# Patient Record
Sex: MALE | Race: WHITE | Employment: FULL TIME | URBAN - METROPOLITAN AREA
[De-identification: names, ages, dates, MRNs, and addresses within clinical notes are randomized per-mention and may not be internally consistent; named-entity substitution may affect disease eponyms.]

---

## 2017-04-25 ENCOUNTER — OFFICE VISIT (OUTPATIENT)
Dept: INTERNAL MEDICINE CLINIC | Age: 32
End: 2017-04-25

## 2017-04-25 VITALS
SYSTOLIC BLOOD PRESSURE: 110 MMHG | HEIGHT: 70 IN | BODY MASS INDEX: 23.77 KG/M2 | WEIGHT: 166 LBS | DIASTOLIC BLOOD PRESSURE: 72 MMHG

## 2017-04-25 DIAGNOSIS — M25.562 LATERAL KNEE PAIN, LEFT: Primary | ICD-10-CM

## 2017-04-25 PROCEDURE — 99213 OFFICE O/P EST LOW 20 MIN: CPT | Performed by: INTERNAL MEDICINE

## 2017-04-25 RX ORDER — MELOXICAM 15 MG/1
15 TABLET ORAL DAILY
Qty: 15 TABLET | Refills: 0 | Status: SHIPPED | OUTPATIENT
Start: 2017-04-25 | End: 2017-08-17 | Stop reason: ALTCHOICE

## 2017-08-17 ENCOUNTER — OFFICE VISIT (OUTPATIENT)
Dept: INTERNAL MEDICINE CLINIC | Age: 32
End: 2017-08-17

## 2017-08-17 VITALS
OXYGEN SATURATION: 98 % | HEART RATE: 75 BPM | BODY MASS INDEX: 22.81 KG/M2 | WEIGHT: 159 LBS | DIASTOLIC BLOOD PRESSURE: 64 MMHG | SYSTOLIC BLOOD PRESSURE: 100 MMHG

## 2017-08-17 DIAGNOSIS — Z00.00 ANNUAL PHYSICAL EXAM: Primary | ICD-10-CM

## 2017-08-17 DIAGNOSIS — C81.70 OTHER CLASSICAL HODGKIN LYMPHOMA, UNSPECIFIED BODY REGION (HCC): ICD-10-CM

## 2017-08-17 DIAGNOSIS — F51.01 PRIMARY INSOMNIA: ICD-10-CM

## 2017-08-17 PROCEDURE — 99395 PREV VISIT EST AGE 18-39: CPT | Performed by: INTERNAL MEDICINE

## 2017-08-17 RX ORDER — PAROXETINE HYDROCHLORIDE 25 MG/1
TABLET, FILM COATED, EXTENDED RELEASE ORAL
Qty: 90 TABLET | Refills: 3 | Status: SHIPPED | OUTPATIENT
Start: 2017-08-17

## 2017-08-17 RX ORDER — TRAZODONE HYDROCHLORIDE 100 MG/1
100 TABLET ORAL NIGHTLY
Qty: 90 TABLET | Refills: 3 | Status: SHIPPED | OUTPATIENT
Start: 2017-08-17 | End: 2021-11-15 | Stop reason: ALTCHOICE

## 2017-09-08 ENCOUNTER — TELEPHONE (OUTPATIENT)
Dept: ENT CLINIC | Age: 32
End: 2017-09-08

## 2018-04-25 ENCOUNTER — OFFICE VISIT (OUTPATIENT)
Dept: INTERNAL MEDICINE CLINIC | Age: 33
End: 2018-04-25

## 2018-04-25 VITALS
DIASTOLIC BLOOD PRESSURE: 62 MMHG | HEIGHT: 70 IN | SYSTOLIC BLOOD PRESSURE: 108 MMHG | HEART RATE: 68 BPM | BODY MASS INDEX: 23.77 KG/M2 | WEIGHT: 166 LBS

## 2018-04-25 DIAGNOSIS — B36.9 FUNGAL DERMATITIS: Primary | ICD-10-CM

## 2018-04-25 PROCEDURE — 99213 OFFICE O/P EST LOW 20 MIN: CPT | Performed by: INTERNAL MEDICINE

## 2018-04-25 ASSESSMENT — PATIENT HEALTH QUESTIONNAIRE - PHQ9
1. LITTLE INTEREST OR PLEASURE IN DOING THINGS: 0
2. FEELING DOWN, DEPRESSED OR HOPELESS: 0
SUM OF ALL RESPONSES TO PHQ QUESTIONS 1-9: 0
SUM OF ALL RESPONSES TO PHQ9 QUESTIONS 1 & 2: 0

## 2018-07-18 ENCOUNTER — OFFICE VISIT (OUTPATIENT)
Dept: INTERNAL MEDICINE CLINIC | Age: 33
End: 2018-07-18

## 2018-07-18 ENCOUNTER — HOSPITAL ENCOUNTER (OUTPATIENT)
Dept: OTHER | Age: 33
Discharge: HOME | End: 2018-07-18
Attending: INTERNAL MEDICINE | Admitting: INTERNAL MEDICINE

## 2018-07-18 VITALS
DIASTOLIC BLOOD PRESSURE: 68 MMHG | WEIGHT: 161 LBS | BODY MASS INDEX: 23.05 KG/M2 | HEIGHT: 70 IN | SYSTOLIC BLOOD PRESSURE: 112 MMHG

## 2018-07-18 DIAGNOSIS — T14.8XXA SPRAIN: ICD-10-CM

## 2018-07-18 DIAGNOSIS — T14.8XXA SPRAIN: Primary | ICD-10-CM

## 2018-07-18 PROCEDURE — 99213 OFFICE O/P EST LOW 20 MIN: CPT | Performed by: INTERNAL MEDICINE

## 2018-07-18 RX ORDER — NAPROXEN 500 MG/1
500 TABLET ORAL 2 TIMES DAILY WITH MEALS
Qty: 30 TABLET | Refills: 0 | Status: SHIPPED | OUTPATIENT
Start: 2018-07-18 | End: 2018-08-10

## 2018-07-19 ENCOUNTER — TELEPHONE (OUTPATIENT)
Dept: INTERNAL MEDICINE CLINIC | Age: 33
End: 2018-07-19

## 2018-07-19 NOTE — TELEPHONE ENCOUNTER
----- Message from Elida Bingham MD sent at 7/18/2018  2:21 PM EDT -----  No evidence of fracture was seen on R hand x-ray    F/u if symptoms persist after naprosyn trial    Elida Bingham

## 2018-08-10 ENCOUNTER — OFFICE VISIT (OUTPATIENT)
Dept: INTERNAL MEDICINE CLINIC | Age: 33
End: 2018-08-10

## 2018-08-10 VITALS
WEIGHT: 161 LBS | HEIGHT: 70 IN | BODY MASS INDEX: 23.05 KG/M2 | DIASTOLIC BLOOD PRESSURE: 68 MMHG | SYSTOLIC BLOOD PRESSURE: 110 MMHG

## 2018-08-10 DIAGNOSIS — S63.501D SPRAIN OF RIGHT WRIST, SUBSEQUENT ENCOUNTER: Primary | ICD-10-CM

## 2018-08-10 PROCEDURE — 99213 OFFICE O/P EST LOW 20 MIN: CPT | Performed by: INTERNAL MEDICINE

## 2018-08-10 RX ORDER — MELOXICAM 15 MG/1
15 TABLET ORAL DAILY
Qty: 30 TABLET | Refills: 0 | Status: SHIPPED | OUTPATIENT
Start: 2018-08-10 | End: 2019-08-27

## 2018-08-10 NOTE — PROGRESS NOTES
Subjective:      Patient ID: Janet Lake is a 33 y.o. male. HPI-c/o persistent R hand pain after mowing lawn, hand x-rays are normal, symptoms persist after 2 weeks Naprosyn    Review of Systems     Current Outpatient Prescriptions on File Prior to Visit   Medication Sig Dispense Refill    traZODone (DESYREL) 100 MG tablet Take 1 tablet by mouth nightly 90 tablet 3    PARoxetine (PAXIL CR) 25 MG extended release tablet TAKE 1 TABLET BY MOUTH EVERY nightly 90 tablet 3     No current facility-administered medications on file prior to visit. Objective:   Physical Exam   Constitutional: He is oriented to person, place, and time. He appears well-developed and well-nourished. /68   Ht 5' 10\" (1.778 m)   Wt 161 lb (73 kg)   BMI 23.10 kg/m²    Neck: No JVD present. Cardiovascular: Normal rate, regular rhythm and normal heart sounds. Pulmonary/Chest: Effort normal and breath sounds normal.   Abdominal: Soft. Bowel sounds are normal.   Musculoskeletal: Normal range of motion. Neurological: He is alert and oriented to person, place, and time. Psychiatric: He has a normal mood and affect. His behavior is normal. Thought content normal.   Nursing note and vitals reviewed.       Assessment:      Persistent R hand pain      Plan:      Hand Surgery referral [facility]    Try Meloxicam 15 mg QD x 1 month    Justina Mckeon MD

## 2019-08-27 ENCOUNTER — HOSPITAL ENCOUNTER (OUTPATIENT)
Age: 34
Discharge: HOME | End: 2019-08-27
Payer: COMMERCIAL

## 2019-08-27 ENCOUNTER — HOSPITAL ENCOUNTER (OUTPATIENT)
Dept: GENERAL RADIOLOGY | Age: 34
Discharge: HOME | End: 2019-08-27
Payer: COMMERCIAL

## 2019-08-27 ENCOUNTER — OFFICE VISIT (OUTPATIENT)
Dept: PRIMARY CARE CLINIC | Age: 34
End: 2019-08-27
Payer: COMMERCIAL

## 2019-08-27 VITALS
BODY MASS INDEX: 23.42 KG/M2 | SYSTOLIC BLOOD PRESSURE: 116 MMHG | WEIGHT: 163.2 LBS | OXYGEN SATURATION: 99 % | HEART RATE: 92 BPM | DIASTOLIC BLOOD PRESSURE: 80 MMHG | TEMPERATURE: 98.2 F

## 2019-08-27 DIAGNOSIS — C81.01 NODULAR LYMPHOCYTE PREDOMINANT HODGKIN LYMPHOMA OF LYMPH NODES OF NECK (HCC): ICD-10-CM

## 2019-08-27 DIAGNOSIS — R11.2 NAUSEA AND VOMITING, INTRACTABILITY OF VOMITING NOT SPECIFIED, UNSPECIFIED VOMITING TYPE: Primary | ICD-10-CM

## 2019-08-27 LAB
A/G RATIO: 1.8 (ref 1.1–2.2)
ALBUMIN SERPL-MCNC: 4.9 G/DL (ref 3.4–5)
ALP BLD-CCNC: 91 U/L (ref 40–129)
ALT SERPL-CCNC: 24 U/L (ref 10–40)
ANION GAP SERPL CALCULATED.3IONS-SCNC: 15 MMOL/L (ref 3–16)
AST SERPL-CCNC: 19 U/L (ref 15–37)
BILIRUB SERPL-MCNC: 0.6 MG/DL (ref 0–1)
BILIRUBIN, POC: ABNORMAL
BLOOD URINE, POC: ABNORMAL
BUN BLDV-MCNC: 14 MG/DL (ref 7–20)
CALCIUM SERPL-MCNC: 10 MG/DL (ref 8.3–10.6)
CHLORIDE BLD-SCNC: 97 MMOL/L (ref 99–110)
CLARITY, POC: CLEAR
CO2: 26 MMOL/L (ref 21–32)
COLOR, POC: YELLOW
CREAT SERPL-MCNC: 1.3 MG/DL (ref 0.9–1.3)
GFR AFRICAN AMERICAN: >60
GFR NON-AFRICAN AMERICAN: >60
GLOBULIN: 2.8 G/DL
GLUCOSE BLD-MCNC: 123 MG/DL (ref 70–99)
GLUCOSE URINE, POC: ABNORMAL
HCT VFR BLD CALC: 49.1 % (ref 40.5–52.5)
HEMOGLOBIN: 17.2 G/DL (ref 13.5–17.5)
KETONES, POC: ABNORMAL
LEUKOCYTE EST, POC: ABNORMAL
MCH RBC QN AUTO: 31.6 PG (ref 26–34)
MCHC RBC AUTO-ENTMCNC: 35 G/DL (ref 31–36)
MCV RBC AUTO: 90.2 FL (ref 80–100)
NITRITE, POC: ABNORMAL
PDW BLD-RTO: 12.3 % (ref 12.4–15.4)
PH, POC: 6.5
PLATELET # BLD: 285 K/UL (ref 135–450)
PMV BLD AUTO: 8.3 FL (ref 5–10.5)
POTASSIUM SERPL-SCNC: 3.8 MMOL/L (ref 3.5–5.1)
PROTEIN, POC: ABNORMAL
RBC # BLD: 5.45 M/UL (ref 4.2–5.9)
SEDIMENTATION RATE, ERYTHROCYTE: 7 MM/HR (ref 0–15)
SODIUM BLD-SCNC: 138 MMOL/L (ref 136–145)
SPECIFIC GRAVITY, POC: 1.01
TOTAL PROTEIN: 7.7 G/DL (ref 6.4–8.2)
UROBILINOGEN, POC: 0.2
WBC # BLD: 7.5 K/UL (ref 4–11)

## 2019-08-27 PROCEDURE — 80053 COMPREHEN METABOLIC PANEL: CPT

## 2019-08-27 PROCEDURE — G8427 DOCREV CUR MEDS BY ELIG CLIN: HCPCS | Performed by: INTERNAL MEDICINE

## 2019-08-27 PROCEDURE — 99213 OFFICE O/P EST LOW 20 MIN: CPT | Performed by: INTERNAL MEDICINE

## 2019-08-27 PROCEDURE — G8420 CALC BMI NORM PARAMETERS: HCPCS | Performed by: INTERNAL MEDICINE

## 2019-08-27 PROCEDURE — 1036F TOBACCO NON-USER: CPT | Performed by: INTERNAL MEDICINE

## 2019-08-27 PROCEDURE — 71046 X-RAY EXAM CHEST 2 VIEWS: CPT

## 2019-08-27 PROCEDURE — 36415 COLL VENOUS BLD VENIPUNCTURE: CPT

## 2019-08-27 PROCEDURE — 85652 RBC SED RATE AUTOMATED: CPT

## 2019-08-27 PROCEDURE — 81002 URINALYSIS NONAUTO W/O SCOPE: CPT | Performed by: INTERNAL MEDICINE

## 2019-08-27 PROCEDURE — 85027 COMPLETE CBC AUTOMATED: CPT

## 2019-08-27 RX ORDER — ONDANSETRON 4 MG/1
4 TABLET, FILM COATED ORAL DAILY PRN
Qty: 30 TABLET | Refills: 0 | Status: SHIPPED | OUTPATIENT
Start: 2019-08-27 | End: 2019-10-15

## 2019-08-27 NOTE — PROGRESS NOTES
Chief Complaint   Patient presents with    Nausea & Vomiting     X 2 weeks, vomiting and coughing, nausea, fever, no appetite, having lower back pain     Subjective:      Patient ID: Enrico Madison is a 29 y.o. male. 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 Objective:   Physical Exam   Constitutional: He is oriented to person, place, and time. He appears well-developed and well-nourished. /80 (Site: Right Upper Arm, Position: Sitting, Cuff Size: Medium Adult)   Pulse 92   Temp 98.2 °F (36.8 °C) (Oral)   Wt 163 lb 3.2 oz (74 kg)   SpO2 99%   BMI 23.42 kg/m²   No palpable lymphadenopathy on exam today   Neck: No JVD present. Cardiovascular: Normal rate, regular rhythm and normal heart sounds. Pulmonary/Chest: Effort normal and breath sounds normal.   Abdominal: Soft. Bowel sounds are normal.   Musculoskeletal: Normal range of motion. Neurological: He is alert and oriented to person, place, and time. Psychiatric: He has a normal mood and affect. His behavior is normal. Thought content normal.   Nursing note and vitals reviewed.     UA-WNL    Assessment:      Patient Active Problem List   Diagnosis    Panic attacks    Mass of right side of neck    Hodgkin's lymphoma ([facility] [address]           Plan:      Check CXR to evaluate

## 2019-10-15 ENCOUNTER — OFFICE VISIT (OUTPATIENT)
Dept: PRIMARY CARE CLINIC | Age: 34
End: 2019-10-15
Payer: COMMERCIAL

## 2019-10-15 VITALS
OXYGEN SATURATION: 99 % | WEIGHT: 169.2 LBS | DIASTOLIC BLOOD PRESSURE: 90 MMHG | TEMPERATURE: 98 F | SYSTOLIC BLOOD PRESSURE: 120 MMHG | BODY MASS INDEX: 24.28 KG/M2 | HEART RATE: 85 BPM

## 2019-10-15 DIAGNOSIS — M62.830 BACK SPASM: Primary | ICD-10-CM

## 2019-10-15 PROCEDURE — 1036F TOBACCO NON-USER: CPT | Performed by: INTERNAL MEDICINE

## 2019-10-15 PROCEDURE — G8420 CALC BMI NORM PARAMETERS: HCPCS | Performed by: INTERNAL MEDICINE

## 2019-10-15 PROCEDURE — G8427 DOCREV CUR MEDS BY ELIG CLIN: HCPCS | Performed by: INTERNAL MEDICINE

## 2019-10-15 PROCEDURE — 99213 OFFICE O/P EST LOW 20 MIN: CPT | Performed by: INTERNAL MEDICINE

## 2019-10-15 PROCEDURE — G8484 FLU IMMUNIZE NO ADMIN: HCPCS | Performed by: INTERNAL MEDICINE

## 2019-10-15 RX ORDER — TIZANIDINE 4 MG/1
4 TABLET ORAL 3 TIMES DAILY PRN
Qty: 30 TABLET | Refills: 0 | Status: SHIPPED | OUTPATIENT
Start: 2019-10-15 | End: 2021-08-10 | Stop reason: ALTCHOICE

## 2019-12-30 ENCOUNTER — PATIENT MESSAGE (OUTPATIENT)
Dept: PRIMARY CARE CLINIC | Age: 34
End: 2019-12-30
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
## 2019-12-31 DIAGNOSIS — M54.50 CHRONIC LOW BACK PAIN WITHOUT SCIATICA, UNSPECIFIED BACK PAIN LATERALITY: Primary | ICD-10-CM

## 2019-12-31 DIAGNOSIS — G89.29 CHRONIC LOW BACK PAIN WITHOUT SCIATICA, UNSPECIFIED BACK PAIN LATERALITY: Primary | ICD-10-CM

## 2021-08-10 ENCOUNTER — OFFICE VISIT (OUTPATIENT)
Dept: PRIMARY CARE CLINIC | Age: 36
End: 2021-08-10
Payer: COMMERCIAL

## 2021-08-10 VITALS
HEART RATE: 77 BPM | DIASTOLIC BLOOD PRESSURE: 86 MMHG | SYSTOLIC BLOOD PRESSURE: 124 MMHG | HEIGHT: 70 IN | BODY MASS INDEX: 25.17 KG/M2 | WEIGHT: 175.8 LBS

## 2021-08-10 DIAGNOSIS — Z11.59 SCREENING FOR VIRAL DISEASE: Primary | ICD-10-CM

## 2021-08-10 DIAGNOSIS — F41.0 PANIC ATTACKS: ICD-10-CM

## 2021-08-10 PROCEDURE — 99213 OFFICE O/P EST LOW 20 MIN: CPT | Performed by: INTERNAL MEDICINE

## 2021-08-10 ASSESSMENT — PATIENT HEALTH QUESTIONNAIRE - PHQ9
SUM OF ALL RESPONSES TO PHQ QUESTIONS 1-9: 1
1. LITTLE INTEREST OR PLEASURE IN DOING THINGS: 1
SUM OF ALL RESPONSES TO PHQ9 QUESTIONS 1 & 2: 1
SUM OF ALL RESPONSES TO PHQ QUESTIONS 1-9: 1
SUM OF ALL RESPONSES TO PHQ QUESTIONS 1-9: 1
2. FEELING DOWN, DEPRESSED OR HOPELESS: 0

## 2021-08-10 NOTE — LETTER
[address] [address]  Phone: [phone]  Fax: [phone]    Christiano House MD        August 10, 2021     Patient: Julissa Ngo   YOB: 1985   Date of Visit: 8/10/2021       To Whom It May Concern: It is my medical opinion that Lugenia Masker may return to full duty immediately with no restrictions. If you have any questions or concerns, please don't hesitate to call.     Sincerely,        Christiano House MD
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
## 2021-08-13 ENCOUNTER — PATIENT MESSAGE (OUTPATIENT)
Dept: PRIMARY CARE CLINIC | Age: 36
End: 2021-08-13

## 2021-08-18 RX ORDER — ONDANSETRON 4 MG/1
4 TABLET, FILM COATED ORAL DAILY PRN
Qty: 30 TABLET | Refills: 0 | Status: SHIPPED | OUTPATIENT
Start: 2021-08-18 | End: 2021-12-13

## 2021-11-15 ENCOUNTER — HOSPITAL ENCOUNTER (OUTPATIENT)
Dept: GENERAL RADIOLOGY | Age: 36
Discharge: HOME | End: 2021-11-15
Payer: COMMERCIAL

## 2021-11-15 ENCOUNTER — TELEPHONE (OUTPATIENT)
Dept: PRIMARY CARE CLINIC | Age: 36
End: 2021-11-15

## 2021-11-15 ENCOUNTER — OFFICE VISIT (OUTPATIENT)
Dept: PRIMARY CARE CLINIC | Age: 36
End: 2021-11-15
Payer: COMMERCIAL

## 2021-11-15 ENCOUNTER — HOSPITAL ENCOUNTER (OUTPATIENT)
Age: 36
Discharge: HOME | End: 2021-11-15
Payer: COMMERCIAL

## 2021-11-15 VITALS
SYSTOLIC BLOOD PRESSURE: 114 MMHG | HEIGHT: 70 IN | DIASTOLIC BLOOD PRESSURE: 76 MMHG | HEART RATE: 107 BPM | BODY MASS INDEX: 25.45 KG/M2 | WEIGHT: 177.8 LBS

## 2021-11-15 DIAGNOSIS — G89.29 CHRONIC LEFT-SIDED LOW BACK PAIN WITHOUT SCIATICA: ICD-10-CM

## 2021-11-15 DIAGNOSIS — M54.50 CHRONIC LEFT-SIDED LOW BACK PAIN WITHOUT SCIATICA: ICD-10-CM

## 2021-11-15 DIAGNOSIS — M54.2 CERVICALGIA OF OCCIPITO-ATLANTO-AXIAL REGION: Primary | ICD-10-CM

## 2021-11-15 PROCEDURE — 99214 OFFICE O/P EST MOD 30 MIN: CPT | Performed by: INTERNAL MEDICINE

## 2021-11-15 PROCEDURE — 72100 X-RAY EXAM L-S SPINE 2/3 VWS: CPT

## 2021-11-15 RX ORDER — TIZANIDINE 4 MG/1
4 TABLET ORAL 3 TIMES DAILY PRN
Qty: 30 TABLET | Refills: 0 | Status: SHIPPED | OUTPATIENT
Start: 2021-11-15 | End: 2021-12-13 | Stop reason: ALTCHOICE

## 2021-11-15 RX ORDER — MELOXICAM 15 MG/1
15 TABLET ORAL DAILY
Qty: 30 TABLET | Refills: 0 | Status: SHIPPED | OUTPATIENT
Start: 2021-11-15 | End: 2021-12-13 | Stop reason: ALTCHOICE
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
## 2021-11-15 NOTE — PROGRESS NOTES
Pt here with neck pain that is going to rt shoulder. Said on and off almost 2 years but more persistent last month. No issues with motion.

## 2021-11-15 NOTE — TELEPHONE ENCOUNTER
Patient stopped to check out, asked if he should get his Covid-19 booster shot? -Please advise patient.

## 2021-11-18 ENCOUNTER — HOSPITAL ENCOUNTER (OUTPATIENT)
Dept: PHYSICAL THERAPY | Age: 36
Setting detail: THERAPIES SERIES
Discharge: HOME | End: 2021-11-18
Payer: COMMERCIAL

## 2021-11-18 PROCEDURE — 97110 THERAPEUTIC EXERCISES: CPT

## 2021-11-18 PROCEDURE — 97161 PT EVAL LOW COMPLEX 20 MIN: CPT

## 2021-11-18 NOTE — FLOWSHEET NOTE
[address] [facility]  Phone: [phone]   Fax: [phone]    Physical Therapy Treatment Note/ Progress Report:     Date:  2021    Patient Name:  Jaylene Bosworth    :  1985  MRN: [id number]  Restrictions/Precautions:    Medical/Treatment Diagnosis Information:  Diagnosis: M54.50, G89.29 (ICD-10-CM) - Chronic left-sided low back pain without sciatica  Treatment Diagnosis: hypomobility of SI/lumbar spine, LLE weakness as compared to R , decreased core/hip strength, decreased functional status. Insurance/Certification information:  PT Insurance Information: [facility]  Physician Information:  Referring Practitioner: Kelly Rueda MD  Plan of care signed (Y/N): []  Yes [x]  No    Date of Patient follow up with Physician:      Progress Report: []  Yes  [x]  No     Date Range for reporting period:  Beginnin21  Ending:     Progress report due (10 Rx/or 30 days whichever is less): visit #10 or      Recertification due (POC duration/ or 90 days whichever is less): visit #12 or 22    Visit # Insurance Allowable Auth required?  Date Range    30 []  Yes  [x]  No          Latex Allergy:  [x]NO      []YES  Preferred Language for Healthcare:   [x]English       []other:    Functional Scale:        Date assessed:  MARTA: raw score = 15; dysfunction = %    21    Pain level:  5-7/10    L SI jt     SUBJECTIVE:  See eval    OBJECTIVE: See eval   Observation:    Test measurements:      RESTRICTIONS/PRECAUTIONS:   Treatment based classification - mobilization + stability     Exercises/Interventions:     Therapeutic Exercise (56870) Resistance / level Sets / Seconds Reps Notes / Cues   bike       IB       HSS       Quad rocking                                           Therapeutic Activities (85188)                                          Neuromuscular Re-ed (61271)       Pelvic tilts on SB                                   Manual Intervention (56576)       Prone sacral fixation SI mobs NPV                                                                Modalities:     Pt. Education:  -pt educated on diagnosis, prognosis and expectations for rehab  -all pt questions were answered    Home Exercise Program:  Access Code: [email or web address]  URL: [email or web address]  Date: 11/18/2021  Prepared by: Ramakrishna Cameron    Exercises  Supine Bridge - 1 x daily - 7 x weekly - 3 sets - 10 reps  Supine Lower Trunk Rotation - 1 x daily - 7 x weekly - 3 sets - 10 reps  Supine Hamstring Stretch - 1 x daily - 7 x weekly - 3 sets - 10 reps  Supine Single Knee to Chest Stretch - 1 x daily - 7 x weekly - 3 sets - 10 reps  Quadruped Rocking Backward - 1 x daily - 7 x weekly - 3 sets - 10 reps      Therapeutic Exercise and NMR EXR  [x] (18060) Provided verbal/tactile cueing for activities related to strengthening, flexibility, endurance, ROM  for improvements in proximal hip and core control with self care, mobility, lifting and ambulation.  [] (60027) Provided verbal/tactile cueing for activities related to improving balance, coordination, kinesthetic sense, posture, motor skill, proprioception  to assist with core control in self care, mobility, lifting, and ambulation.   [] (54253) Therapist is in constant attendance of 2 or more patients providing skilled therapy interventions, but not providing any significant amount of measurable one-on-one time to either patient, for improvements in LE, proximal hip, and core control in self care, mobility, lifting, ambulation and eccentric single leg control.      Therapeutic Activities:    [] (54291 or 08067) Provided verbal/tactile cueing for activities related to improving balance, coordination, kinesthetic sense, posture, motor skill, proprioception and motor activation to allow for proper function  with self care and ADLs  [] (78016) Provided training and instruction to the patient for proper core and proximal hip recruitment and positioning with ambulation re-education     Home Exercise Program:    [x] (66040) Reviewed/Progressed HEP activities related to strengthening, flexibility, endurance, ROM of core, proximal hip and LE for functional self-care, mobility, lifting and ambulation   [] (60851) Reviewed/Progressed HEP activities related to improving balance, coordination, kinesthetic sense, posture, motor skill, proprioception of core, proximal hip and LE for self care, mobility, lifting, and ambulation      Manual Treatments:  PROM / STM / Oscillations-Mobs:  G-I, II, III, IV (PA's, Inf., Post.)  [] (94868) Provided manual therapy to mobilize proximal hip and LS spine soft tissue/joints for the purpose of modulating pain, promoting relaxation,  increasing ROM, reducing/eliminating soft tissue swelling/inflammation/restriction, improving soft tissue extensibility and allowing for proper ROM for normal function with self care, mobility, lifting and ambulation. Charges:  Timed Code Treatment Minutes: 15   Total Treatment Minutes: 40       [x] EVAL - LOW (17898)   [] EVAL - MOD (18830)  [] EVAL - HIGH (25021)  [] RE-EVAL (43471)  [x] DV(45900) x 1     [] Ionto  [] NMR (00643) x      [] Vaso  [] Manual (52400) x      [] Ultrasound  [] TA x       [] Mech Traction (55976)  [] GaitTraining x     [] ES (un) (24215):   [] Home Management Training [] ES(attended) (02819)             [] Aquatics    [] Group  [] Other    GOALS:  Patient stated goal: decrease pain   []? Progressing: []? Met: []? Not Met: []? Adjusted     Therapist goals for Patient:   Short Term Goals: To be achieved in: 2 weeks  1. Independent in HEP and progression per patient tolerance, in order to prevent re-injury. []? Progressing: []? Met: []? Not Met: []? Adjusted  2. Patient will have a decrease in pain to facilitate improvement in movement, function, and ADLs as indicated by Functional Deficits. []? Progressing: []? Met: []? Not Met: []? Adjusted     Long Term Goals:  To be achieved in: 6 weeks  1. Disability index score of 10% or less for the MARTA to assist with reaching prior level of function. []? Progressing: []? Met: []? Not Met: []? Adjusted  2. Patient will demonstrate increased AROM to WNL, good LS mobility, good hip ROM to allow for proper joint functioning as indicated by patients Functional Deficits. []? Progressing: []? Met: []? Not Met: []? Adjusted  3. Patient will demonstrate an increase in Strength to 5/5  good proximal hip and core activation to allow for proper functional mobility as indicated by patients Functional Deficits. []? Progressing: []? Met: []? Not Met: []? Adjusted  4. Patient will return to functional activities including work, ADLs without increased symptoms or restriction. []? Progressing: []? Met: []? Not Met: []? Adjusted    Overall Progression Towards Functional goals/ Treatment Progress Update:  [] Patient is progressing as expected towards functional goals listed. [] Progression is slowed due to complexities/Impairments listed. [] Progression has been slowed due to co-morbidities. [x] Plan just implemented, too soon to assess goals progression <30days   [] Goals require adjustment due to lack of progress  [] Patient is not progressing as expected and requires additional follow up with physician  [] Other    Persisting Functional Limitations/Impairments:  []Sitting []Standing   []Walking []Squatting/bending    []Stairs []ADL's    []Transfers []Reaching  []Housework []Job related tasks  []Driving []Sports/Recreation   []Sleeping []Other:    ASSESSMENT:  See eval  Treatment/Activity Tolerance:  [x] Patient able to complete tx  [] Patient limited by fatique  [x] Patient limited by pain  [] Patient limited by other medical complications  [] Other:     Prognosis: [x] Good [] Fair  [] Poor    Patient Requires Follow-up: [x] Yes  [] No    PLAN: See eval. PT 1-2x / week for 6 weeks.  SI mobs then mobility - then strengthen   [] Continue per plan of care []

## 2021-11-18 NOTE — PLAN OF CARE
only on L side at PSIS and does not radiate down leg. Pt has hx of cancer and  finished CA tx 2014. 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 Going from sit to stand is painful and if he sits for too long. Xray of lumbar spine : normal     Fear avoidance: I should not do physical activities that (might) make my pain worse   [] True   [x] False     Relevant Medical History: hodgkin's lymphoma 2014, knee scope  Functional Outcome: Oswestry: raw score = 15; dysfunction = %    Pain Scale: 5-7/10  Easing factors: rest, ice or heat, medication   Provocative factors: movement, working     Type: []Constant   [x]Intermittent  []Radiating []Localized []other:     Numbness/Tingling: yes B feet due to chemo back in 2014    Occupation/School: full time [employer]  . 9-11 hours a day. Living Status/Prior Level of Function: Prior to this injury / incident, pt was independent with ADLs and IADLs, Pt enjoys golf and baseball.      OBJECTIVE:   Palpation: TTP at L PSIS    Functional Mobility/Transfers: IND - slightly painful at L SI joint    Posture: decreased lumbar lordosis when sitting    Inspection: unremarkable     Gait: (include devices/WB status) WNL, slight decreased arm swing     Bandages/Dressings/Incisions: NA    Dermatomes Normal Abnormal Comments   inguinal area (L1)       anterior mid-thigh (L2)      distal ant thigh/med knee (L3)      medial lower leg and foot (L4)  x    lateral lower leg and foot (L5)  x    posterior calf (S1) x     medial calcaneus (S2) x         Reflexes Normal Abnormal Comments   S1-2 Seated achilles x     S1-2 Prone knee bend      L3-4 Patellar tendon x  R - brisk   Clonus x     Babinski      hoffmans - normal        Bold = pain at end range  ROM  Comments   Lumbar Flex WNL    Lumbar Ext WNL Decreased L sacral sulci movement      ROM LEFT RIGHT Comments   Lumbar Side Bend WNL WNL    Lumbar Rotation WNL WNL    Hip Flexion WNL WNL    Hip Abd      Hip ER WNL WNL    Hip IR WNL WNL    Hip Extension      Knee Ext      Knee Flex      Hamstring Flex + +    Piriformis                      Joint mobility: LUMBAR / L SI   []Normal    [x]Hypo   []Hyper      Strength / Myotomes LEFT RIGHT Comments   Multifidus      Transverse Ab      Hip Flexors (L1-2) 4 5    Hip Abductors 4+ 5    Hip Extensors      Hip Internal Rotators      Hip External Rotators      Quads (L2-4) 4+ 5    Hamstrings  4+ 5    Ankle Plantarflexion (S1-2)      Ankle Dorsiflexion (L4-5) 4+ 5    Ankle Inversion      Ankle Eversion (S1-2)      Great Toe Extension (L5)            Neural dynamic tension testing Normal Abnormal Comments   Slump Test  - Degree of knee flexion:  x     SLR  x     0-30      30-70      Femoral nerve (L2-4)          Orthopedic Special Tests:    Normal Abnormal N/A Comments   Toe walk   x      Heel Walk x      Fwd Bend-aberrant or innominate mvmt) x      Trendelenburg x      Kemps/Quadrant       Stork       ZOILA/Ridge  x  Painful L SI   Hip scour       SLR x      Crossed SLR x      Supine to sit       Hip thrust  x  Pain at L SI   SI distraction/compression x      PA/Spring  x     Prone Instability test       Prone knee bend       Sacral Spring/thrust  x                [x] Patient history, allergies, meds reviewed. Medical chart reviewed. See intake form. Review Of Systems (ROS):  [x]Performed Review of systems (Integumentary, CardioPulmonary, Neurological) by intake and observation. Intake form has been scanned into medical record. Patient has been instructed to contact their primary care physician regarding ROS issues if not already being addressed at this time.       Co-morbidities/Complexities (which will affect course of rehabilitation):   []None        []Hx of COVID   Arthritic conditions   []Rheumatoid arthritis (M05.9)  []Osteoarthritis (M19.91)  []Gout   Cardiovascular conditions   []Hypertension (I10)  []Hyperlipidemia (E78.5)  []Angina pectoris (I20)  []Atherosclerosis (I70)  []Pacemaker  []Hx of CABG/stent/  cardiac surgeries   Musculoskeletal conditions   []Disc pathology   []Congenital spine pathologies   []Osteoporosis (M81.8)  []Osteopenia (M85.8)  []Scoliosis       Endocrine conditions   []Hypothyroid (E03.9)  []Hyperthyroid Gastrointestinal conditions   []Constipation (N96.94)   Metabolic conditions   []Morbid obesity (E66.01)  []Diabetes type 1(E10.65) or 2 (E11.65)   []Neuropathy (G60.9)     Cardio/Pulmonary conditions   []Asthma (J45)  []Coughing   []COPD (J44.9)  []CHF  []A-fib   Psychological Disorders  []Anxiety (F41.9)  []Depression (F32.9)   []Other:   Developmental Disorders  []Autism (F84.0)  []CP (G80)  []Down Syndrome (Q90.9)  []Developmental delay     Neurological conditions  []Prior Stroke (I69.30)  []Parkinson's (G20)  []Encephalopathy (G93.40)  []MS (G35)  []Post-polio (G14)  []SCI  []TBI  []ALS Other conditions  []Fibromyalgia (M79.7)  []Vertigo  []Syncope  []Kidney Failure  [x]Cancer      []currently undergoing                treatment  []Pregnancy  []Incontinence   Prior surgeries  []involved limb  []previous spinal surgery  [] section birth  []hysterectomy  []bowel / bladder surgery  []other relevant surgeries   []Other:              Barriers to/and or personal factors that will affect rehab potential:              []Age  []Sex    []Smoker              []Motivation/Lack of Motivation                        []Co-Morbidities              []Cognitive Function, education/learning barriers              []Environmental, home barriers              []profession/work barriers  []past PT/medical experience  [x]other: chronic issue  Justification:     Falls Risk Assessment (30 days):   [x] Falls Risk assessed and no intervention required.   [] Falls Risk assessed and Patient requires intervention due to being higher risk   TUG score (>12s at risk):     [] Falls education provided, including         ASSESSMENT:   Functional Impairments:     [x]Noted lumbar/proximal hip hypomobility   []Noted lumbosacral and/or generalized hypermobility   [x]Decreased Lumbosacral/hip/LE functional ROM   [x]Decreased core/proximal hip strength and neuromuscular control    [x]Decreased LE functional strength    []Abnormal reflexes/sensation/myotomal/dermatomal deficits  []Reduced balance/proprioceptive control    []other:      Functional Activity Limitations (from functional questionnaire and intake)   [x]Reduced ability to tolerate prolonged functional positions   [x]Reduced ability or difficulty with changes of positions or transfers between positions   [x]Reduced ability to maintain good posture and demonstrate good body mechanics with sitting, bending, and lifting   [x]Reduced ability to sleep   [x] Reduced ability or tolerance with driving and/or computer work   [x]Reduced ability to perform lifting, reaching, carrying tasks   [x]Reduced ability to squat   [x]Reduced ability to forward bend   [x]Reduced ability to ambulate prolonged functional periods/distances/surfaces   [x]Reduced ability to ascend/descend stairs   []other:       Participation Restrictions   [x]Reduced participation in self care activities   [x]Reduced participation in home management activities   [x]Reduced participation in work activities   [x]Reduced participation in social activities. [x]Reduced participation in sport/recreational activities. Classification:   [x]Signs/symptoms consistent with Lumbar instability/stabilization subgroup. [x]Signs/symptoms consistent with Lumbar mobilization/ subgroup, myotomes and dermatomes intact. Meets manipulation criteria.     []Signs/symptoms consistent with Lumbar direction specific/centralization subgroup   []Signs/symptoms consistent with Lumbar traction subgroup     []Signs/symptoms consistent with lumbar facet dysfunction   []Signs/symptoms consistent with lumbar stenosis type dysfunction   []Signs/symptoms consistent with nerve root involvement including myotome & indicated for Hip complex, LE and spine to include: Dry Needling/IASTM, STM, PROM, Gr I-IV mobilizations, manipulation. [x]  Modalities as needed that may include: thermal agents, E-stim, Biofeedback, US, iontophoresis as indicated  [x]  Patient education on joint protection, postural re-education, activity modification, progression of HEP. HEP instruction: Written HEP instructions provided and reviewed     GOALS:  Patient stated goal: decrease pain   [] Progressing: [] Met: [] Not Met: [] Adjusted    Therapist goals for Patient:   Short Term Goals: To be achieved in: 2 weeks  1. Independent in HEP and progression per patient tolerance, in order to prevent re-injury. [] Progressing: [] Met: [] Not Met: [] Adjusted  2. Patient will have a decrease in pain to facilitate improvement in movement, function, and ADLs as indicated by Functional Deficits. [] Progressing: [] Met: [] Not Met: [] Adjusted    Long Term Goals: To be achieved in: 6 weeks  1. Disability index score of 10% or less for the MARTA to assist with reaching prior level of function. [] Progressing: [] Met: [] Not Met: [] Adjusted  2. Patient will demonstrate increased AROM to WNL, good LS mobility, good hip ROM to allow for proper joint functioning as indicated by patients Functional Deficits. [] Progressing: [] Met: [] Not Met: [] Adjusted  3. Patient will demonstrate an increase in Strength to 5/5  good proximal hip and core activation to allow for proper functional mobility as indicated by patients Functional Deficits. [] Progressing: [] Met: [] Not Met: [] Adjusted  4. Patient will return to functional activities including work, ADLs without increased symptoms or restriction.    [] Progressing: [] Met: [] Not Met: [] Adjusted       Electronically signed by:  Nick Rosado PT

## 2021-12-02 ENCOUNTER — HOSPITAL ENCOUNTER (OUTPATIENT)
Dept: PHYSICAL THERAPY | Age: 36
Setting detail: THERAPIES SERIES
Discharge: HOME | End: 2021-12-02
Payer: COMMERCIAL

## 2021-12-02 PROCEDURE — 97140 MANUAL THERAPY 1/> REGIONS: CPT

## 2021-12-02 PROCEDURE — 97112 NEUROMUSCULAR REEDUCATION: CPT

## 2021-12-02 PROCEDURE — 97110 THERAPEUTIC EXERCISES: CPT

## 2021-12-02 NOTE — FLOWSHEET NOTE
[facility]  Phone: [phone]   Fax: [phone]    Physical Therapy Treatment Note/ Progress Report:     Date:  2021    Patient Name:  Conchis Oakley    :  1985  MRN: [id number]  Restrictions/Precautions:    Medical/Treatment Diagnosis Information:  Diagnosis: M54.50, G89.29 (ICD-10-CM) - Chronic left-sided low back pain without sciatica  Treatment Diagnosis: hypomobility of SI/lumbar spine, LLE weakness as compared to R , decreased core/hip strength, decreased functional status. Insurance/Certification information:  PT Insurance Information: [facility]  Physician Information:  Referring Practitioner: Yesi Fishman MD  Plan of care signed (Y/N): []  Yes [x]  No    Date of Patient follow up with Physician:      Progress Report: []  Yes  [x]  No     Date Range for reporting period:  Beginnin21  Ending:     Progress report due (10 Rx/or 30 days whichever is less): visit #10 or 10/90/47     Recertification due (POC duration/ or 90 days whichever is less): visit #12 or 22    Visit # Insurance Allowable Auth required? Date Range   2 30 []  Yes  [x]  No          Latex Allergy:  [x]NO      []YES  Preferred Language for Healthcare:   [x]English       []other:     Functional Scale:        Date assessed:  MARTA: raw score = 15; dysfunction = %    21    Pain level:  5-7/10    L SI jt     SUBJECTIVE:  Pt states no improvement since evaluation, still not working.      OBJECTIVE:    Observation:    Test measurements:      RESTRICTIONS/PRECAUTIONS:   Treatment based classification - mobilization + stability     Exercises/Interventions: bilateral unless noted     Therapeutic Exercise (36052) Resistance / level Sets / Seconds Reps Notes / Cues   bike  4'     Supine HSS flossing   5'' 10    Quad rocking   5'' 10    Prone press ups  5'' 10    DKC  5'' 10    Bridge + TB abd  3'' 15    Leg press  90# 2 10                         Therapeutic Activities (19460) Neuromuscular Re-ed (85253)       TrA Pelvic tilts on SB  A/p, M/L , CW/CWW  x10 ea                                 Manual Intervention (82341)       L Prone sacral fixation SI mobs  x10'                                                               Modalities:     Pt. Education:  -pt educated on diagnosis, prognosis and expectations for rehab  -all pt questions were answered    Home Exercise Program:  Access Code: [email or web address]  URL: [employer]/  Date: 11/18/2021  Prepared by: Miguelina Payment    Exercises  Supine Bridge - 1 x daily - 7 x weekly - 3 sets - 10 reps  Supine Lower Trunk Rotation - 1 x daily - 7 x weekly - 3 sets - 10 reps  Supine Hamstring Stretch - 1 x daily - 7 x weekly - 3 sets - 10 reps  Supine Single Knee to Chest Stretch - 1 x daily - 7 x weekly - 3 sets - 10 reps  Quadruped Rocking Backward - 1 x daily - 7 x weekly - 3 sets - 10 reps      Therapeutic Exercise and NMR EXR  [x] (77386) Provided verbal/tactile cueing for activities related to strengthening, flexibility, endurance, ROM  for improvements in proximal hip and core control with self care, mobility, lifting and ambulation.  [] (26151) Provided verbal/tactile cueing for activities related to improving balance, coordination, kinesthetic sense, posture, motor skill, proprioception  to assist with core control in self care, mobility, lifting, and ambulation.   [] (48491) Therapist is in constant attendance of 2 or more patients providing skilled therapy interventions, but not providing any significant amount of measurable one-on-one time to either patient, for improvements in LE, proximal hip, and core control in self care, mobility, lifting, ambulation and eccentric single leg control.      Therapeutic Activities:    [] (94849 or 38493) Provided verbal/tactile cueing for activities related to improving balance, coordination, kinesthetic sense, posture, motor skill, proprioception and motor activation to allow for proper function  with self care and ADLs  [] (51196) Provided training and instruction to the patient for proper core and proximal hip recruitment and positioning with ambulation re-education     Home Exercise Program:    [x] (15177) Reviewed/Progressed HEP activities related to strengthening, flexibility, endurance, ROM of core, proximal hip and LE for functional self-care, mobility, lifting and ambulation   [] (84378) Reviewed/Progressed HEP activities related to improving balance, coordination, kinesthetic sense, posture, motor skill, proprioception of core, proximal hip and LE for self care, mobility, lifting, and ambulation      Manual Treatments:  PROM / STM / Oscillations-Mobs:  G-I, II, III, IV (PA's, Inf., Post.)  [] (85257) Provided manual therapy to mobilize proximal hip and LS spine soft tissue/joints for the purpose of modulating pain, promoting relaxation,  increasing ROM, reducing/eliminating soft tissue swelling/inflammation/restriction, improving soft tissue extensibility and allowing for proper ROM for normal function with self care, mobility, lifting and ambulation. Charges:  Timed Code Treatment Minutes: 40   Total Treatment Minutes: 40       [] EVAL - LOW (58463)   [] EVAL - MOD (08425)  [] EVAL - HIGH (21583)  [] RE-EVAL (98844)  [x] FV(17462) x 1     [] Ionto  [x] NMR (00763) x 1      [] Vaso  [x] Manual (48759) x 1   [] Ultrasound  [] TA x       [] Mech Traction (81710)  [] GaitTraining x     [] ES (un) (16519):   [] Home Management Training [] ES(attended) (64186)             [] Aquatics    [] Group  [] Other    GOALS:  Patient stated goal: decrease pain   []? Progressing: []? Met: []? Not Met: []? Adjusted     Therapist goals for Patient:   Short Term Goals: To be achieved in: 2 weeks  1. Independent in HEP and progression per patient tolerance, in order to prevent re-injury. []? Progressing: []? Met: []? Not Met: []? Adjusted  2.  Patient will have a decrease in pain to facilitate improvement in movement, function, and ADLs as indicated by Functional Deficits. []? Progressing: []? Met: []? Not Met: []? Adjusted     Long Term Goals: To be achieved in: 6 weeks  1. Disability index score of 10% or less for the MARTA to assist with reaching prior level of function. []? Progressing: []? Met: []? Not Met: []? Adjusted  2. Patient will demonstrate increased AROM to WNL, good LS mobility, good hip ROM to allow for proper joint functioning as indicated by patients Functional Deficits. []? Progressing: []? Met: []? Not Met: []? Adjusted  3. Patient will demonstrate an increase in Strength to 5/5  good proximal hip and core activation to allow for proper functional mobility as indicated by patients Functional Deficits. []? Progressing: []? Met: []? Not Met: []? Adjusted  4. Patient will return to functional activities including work, ADLs without increased symptoms or restriction. []? Progressing: []? Met: []? Not Met: []? Adjusted    Overall Progression Towards Functional goals/ Treatment Progress Update:  [] Patient is progressing as expected towards functional goals listed. [] Progression is slowed due to complexities/Impairments listed. [] Progression has been slowed due to co-morbidities. [x] Plan just implemented, too soon to assess goals progression <30days   [] Goals require adjustment due to lack of progress  [] Patient is not progressing as expected and requires additional follow up with physician  [] Other    Persisting Functional Limitations/Impairments:  []Sitting []Standing   []Walking []Squatting/bending    []Stairs []ADL's    []Transfers []Reaching  []Housework []Job related tasks  []Driving []Sports/Recreation   []Sleeping []Other:    ASSESSMENT:  Treatment today focus on manual to SI to improve L nutation followed by mobility exercises + core strength. Pt noted with improved L sacral nutation at post manual assessment.  Pt was sore after visit but PT explained that some soreness is expected. PT reviewed HEP and importance for future progress. Treatment/Activity Tolerance:  [x] Patient able to complete tx  [] Patient limited by fatique  [x] Patient limited by pain  [] Patient limited by other medical complications  [] Other:     Prognosis: [x] Good [] Fair  [] Poor    Patient Requires Follow-up: [x] Yes  [] No    PLAN: See eval. PT 1-2x / week for 6 weeks. SI mobs then mobility - then strengthen   [x] Continue per plan of care [] Alter current plan (see comments)  [] Plan of care initiated [] Hold pending MD visit [] Discharge    Electronically signed by: Himanshu Hahn, PT PT, DPT      Note: If patient does not return for scheduled/ recommended follow up visits, this note will serve as a discharge from care along with most recent update on progress.

## 2021-12-09 ENCOUNTER — HOSPITAL ENCOUNTER (OUTPATIENT)
Dept: PHYSICAL THERAPY | Age: 36
Setting detail: THERAPIES SERIES
Discharge: HOME | End: 2021-12-09
Payer: COMMERCIAL

## 2021-12-09 PROCEDURE — 97110 THERAPEUTIC EXERCISES: CPT

## 2021-12-09 PROCEDURE — 97140 MANUAL THERAPY 1/> REGIONS: CPT

## 2021-12-09 NOTE — FLOWSHEET NOTE
2021 [facility] Physical Therapy  Phone: [phone]   Fax: [phone]    Physical Therapy Treatment Note/ Progress Report:     Date:  2021    Patient Name:  Zachariah Burnham    :  1985  MRN: [id number]  Restrictions/Precautions:    Medical/Treatment Diagnosis Information:  Diagnosis: M54.50, G89.29 (ICD-10-CM) - Chronic left-sided low back pain without sciatica  Treatment Diagnosis: hypomobility of SI/lumbar spine, LLE weakness as compared to R , decreased core/hip strength, decreased functional status. Insurance/Certification information:  PT Insurance Information: [facility]  Physician Information:  Referring Practitioner: Keith Hirsch MD  Plan of care signed (Y/N): []  Yes [x]  No    Date of Patient follow up with Physician:      Progress Report: []  Yes  [x]  No     Date Range for reporting period:  Beginnin21  Ending:     Progress report due (10 Rx/or 30 days whichever is less): visit #10 or      Recertification due (POC duration/ or 90 days whichever is less): visit #12 or 22    Visit # Insurance Allowable Auth required? Date Range   3 30 []  Yes  [x]  No          Latex Allergy:  [x]NO      []YES  Preferred Language for Healthcare:   [x]English       []other:     Functional Scale:        Date assessed:  MARTA: raw score = 15; dysfunction = %    21    Pain level:  5/10    L SI jt     SUBJECTIVE:  Pt reports things are about the same, muscles in low back are hurting and tight.      OBJECTIVE:    Observation:    Test measurements:      RESTRICTIONS/PRECAUTIONS:   Treatment based classification - mobilization + stability     Exercises/Interventions: bilateral unless noted     Therapeutic Exercise (12574) Resistance / level Sets / Seconds Reps Notes / Cues   bike  4'     Supine HSS flossing   5'' 10    Quad rocking   5'' 10    Prone press ups  5'' 10    DKC  5'' 10    Bridge +  On SB  3'' 15    Leg press  90# 2 10    SB flexion roll out flexion + SB  x10 Therapeutic Activities (02745)                                          Neuromuscular Re-ed (31210)       TrA Pelvic tilts on SB  A/p, M/L , CW/CWW  x10 ea                                                             Manual Intervention (21714)       L Prone sacral fixation SI mobs  x10'     Cupping to lumbar paraspinals  x9'   4 cups PT moving cup along paraspinals   Manual re-assessment x5'      Supine lumbopelvic roll x5'   B                                        Modalities:     Pt. Education:  -pt educated on diagnosis, prognosis and expectations for rehab  -all pt questions were answered    Home Exercise Program:  Access Code: [email or web address]  URL: [employer]/  Date: 11/18/2021  Prepared by: Omega Luís    Exercises  Supine Bridge - 1 x daily - 7 x weekly - 3 sets - 10 reps  Supine Lower Trunk Rotation - 1 x daily - 7 x weekly - 3 sets - 10 reps  Supine Hamstring Stretch - 1 x daily - 7 x weekly - 3 sets - 10 reps  Supine Single Knee to Chest Stretch - 1 x daily - 7 x weekly - 3 sets - 10 reps  Quadruped Rocking Backward - 1 x daily - 7 x weekly - 3 sets - 10 reps      Therapeutic Exercise and NMR EXR  [x] (62360) Provided verbal/tactile cueing for activities related to strengthening, flexibility, endurance, ROM  for improvements in proximal hip and core control with self care, mobility, lifting and ambulation.  [] (96277) Provided verbal/tactile cueing for activities related to improving balance, coordination, kinesthetic sense, posture, motor skill, proprioception  to assist with core control in self care, mobility, lifting, and ambulation.   [] (44795) Therapist is in constant attendance of 2 or more patients providing skilled therapy interventions, but not providing any significant amount of measurable one-on-one time to either patient, for improvements in LE, proximal hip, and core control in self care, mobility, lifting, ambulation and eccentric single leg control. Therapeutic Activities:    [] (38274 or 09813) Provided verbal/tactile cueing for activities related to improving balance, coordination, kinesthetic sense, posture, motor skill, proprioception and motor activation to allow for proper function  with self care and ADLs  [] (57750) Provided training and instruction to the patient for proper core and proximal hip recruitment and positioning with ambulation re-education     Home Exercise Program:    [x] (39487) Reviewed/Progressed HEP activities related to strengthening, flexibility, endurance, ROM of core, proximal hip and LE for functional self-care, mobility, lifting and ambulation   [] (10134) Reviewed/Progressed HEP activities related to improving balance, coordination, kinesthetic sense, posture, motor skill, proprioception of core, proximal hip and LE for self care, mobility, lifting, and ambulation      Manual Treatments:  PROM / STM / Oscillations-Mobs:  G-I, II, III, IV (PA's, Inf., Post.)  [] (56506) Provided manual therapy to mobilize proximal hip and LS spine soft tissue/joints for the purpose of modulating pain, promoting relaxation,  increasing ROM, reducing/eliminating soft tissue swelling/inflammation/restriction, improving soft tissue extensibility and allowing for proper ROM for normal function with self care, mobility, lifting and ambulation. Charges:  Timed Code Treatment Minutes: 45   Total Treatment Minutes: 45       [] EVAL - LOW (29270)   [] EVAL - MOD (39363)  [] EVAL - HIGH (09324)  [] RE-EVAL (88739)  [x] MR(92836) x 1     [] Ionto  [] NMR (60219) x 1      [] Vaso  [x] Manual (78751) x 2   [] Ultrasound  [] TA x       [] Mech Traction (44726)  [] GaitTraining x     [] ES (un) (90151):   [] Home Management Training [] ES(attended) (70036)             [] Aquatics    [] Group  [] Other    GOALS:  Patient stated goal: decrease pain   []? Progressing: []? Met: []? Not Met: []?  Adjusted     Therapist goals for Patient: Short Term Goals: To be achieved in: 2 weeks  1. Independent in HEP and progression per patient tolerance, in order to prevent re-injury. []? Progressing: []? Met: []? Not Met: []? Adjusted  2. Patient will have a decrease in pain to facilitate improvement in movement, function, and ADLs as indicated by Functional Deficits. []? Progressing: []? Met: []? Not Met: []? Adjusted     Long Term Goals: To be achieved in: 6 weeks  1. Disability index score of 10% or less for the MARTA to assist with reaching prior level of function. []? Progressing: []? Met: []? Not Met: []? Adjusted  2. Patient will demonstrate increased AROM to WNL, good LS mobility, good hip ROM to allow for proper joint functioning as indicated by patients Functional Deficits. []? Progressing: []? Met: []? Not Met: []? Adjusted  3. Patient will demonstrate an increase in Strength to 5/5  good proximal hip and core activation to allow for proper functional mobility as indicated by patients Functional Deficits. []? Progressing: []? Met: []? Not Met: []? Adjusted  4. Patient will return to functional activities including work, ADLs without increased symptoms or restriction. []? Progressing: []? Met: []? Not Met: []? Adjusted    Overall Progression Towards Functional goals/ Treatment Progress Update:  [] Patient is progressing as expected towards functional goals listed. [] Progression is slowed due to complexities/Impairments listed. [] Progression has been slowed due to co-morbidities.   [x] Plan just implemented, too soon to assess goals progression <30days   [] Goals require adjustment due to lack of progress  [] Patient is not progressing as expected and requires additional follow up with physician  [] Other    Persisting Functional Limitations/Impairments:  []Sitting []Standing   []Walking []Squatting/bending    []Stairs []ADL's    []Transfers []Reaching  []Housework []Job related tasks  []Driving []Sports/Recreation []Sleeping []Other:    ASSESSMENT:  Treatment today focus on manual to SI to improve L nutation followed by mobility exercises + core strength. Added cupping due to increased muscle tension in paraspinals. PT also did manual re-assessment and had positive supine to sit and prone knee flexion, did supine lumbo pelvic roll with good results, no cavitiation. Pt reported improved mobility post session. Complete progress note next visit. Treatment/Activity Tolerance:  [x] Patient able to complete tx  [] Patient limited by fatique  [x] Patient limited by pain  [] Patient limited by other medical complications  [] Other:     Prognosis: [x] Good [] Fair  [] Poor    Patient Requires Follow-up: [x] Yes  [] No    PLAN: See eval. PT 1-2x / week for 6 weeks. SI mobs then mobility - then strengthen   [x] Continue per plan of care [] Alter current plan (see comments)  [] Plan of care initiated [] Hold pending MD visit [] Discharge    Electronically signed by: Aric Aguilar, PT PT, DPT, OMT-C      Note: If patient does not return for scheduled/ recommended follow up visits, this note will serve as a discharge from care along with most recent update on progress.

## 2021-12-13 ENCOUNTER — OFFICE VISIT (OUTPATIENT)
Dept: PRIMARY CARE CLINIC | Age: 36
End: 2021-12-13
Payer: COMMERCIAL

## 2021-12-13 VITALS
WEIGHT: 177 LBS | HEIGHT: 70 IN | HEART RATE: 102 BPM | DIASTOLIC BLOOD PRESSURE: 82 MMHG | BODY MASS INDEX: 25.34 KG/M2 | SYSTOLIC BLOOD PRESSURE: 108 MMHG

## 2021-12-13 DIAGNOSIS — G89.29 OTHER CHRONIC BACK PAIN: Primary | ICD-10-CM

## 2021-12-13 DIAGNOSIS — M54.9 OTHER CHRONIC BACK PAIN: Primary | ICD-10-CM

## 2021-12-13 DIAGNOSIS — J32.9 SINUSITIS, UNSPECIFIED CHRONICITY, UNSPECIFIED LOCATION: ICD-10-CM

## 2021-12-13 PROCEDURE — 4004F PT TOBACCO SCREEN RCVD TLK: CPT | Performed by: INTERNAL MEDICINE

## 2021-12-13 PROCEDURE — G8419 CALC BMI OUT NRM PARAM NOF/U: HCPCS | Performed by: INTERNAL MEDICINE

## 2021-12-13 PROCEDURE — 99213 OFFICE O/P EST LOW 20 MIN: CPT | Performed by: INTERNAL MEDICINE

## 2021-12-13 PROCEDURE — G8427 DOCREV CUR MEDS BY ELIG CLIN: HCPCS | Performed by: INTERNAL MEDICINE

## 2021-12-13 PROCEDURE — G8484 FLU IMMUNIZE NO ADMIN: HCPCS | Performed by: INTERNAL MEDICINE

## 2021-12-13 RX ORDER — AMOXICILLIN 500 MG/1
500 CAPSULE ORAL 3 TIMES DAILY
Qty: 30 CAPSULE | Refills: 0 | Status: SHIPPED | OUTPATIENT
Start: 2021-12-13 | End: 2021-12-23

## 2021-12-13 RX ORDER — AZELASTINE 1 MG/ML
1 SPRAY, METERED NASAL 2 TIMES DAILY
Qty: 60 ML | Refills: 1 | Status: SHIPPED | OUTPATIENT
Start: 2021-12-13 | End: 2022-07-25

## 2021-12-13 NOTE — LETTER
[address] [address]  Phone: [phone]  Fax: [phone]    Colt Gonzalez MD        December 13, 2021     Patient: Juan Blakely   YOB: 1985   Date of Visit: 12/13/2021       To Whom It May Concern: It is my medical opinion that Juan Blakely may return to light duty immediately with the following restrictions: lifting/carrying not to exceed 10 lbs. .    If you have any questions or concerns, please don't hesitate to call.     Sincerely,        Colt Gonzalez MD

## 2021-12-13 NOTE — PROGRESS NOTES
Pt here for follow up on back pain. Back is still pretty sore. Doing manual manipulation of the SI joint. Still having several muscle spasms. PT is re-eval in 2 days. Would like to see if can continue light duty     Also while here would like to be seen for sinus congestion with PND. Congestion has been there about a week and phlegm is coming out as a dark green.

## 2021-12-13 NOTE — PROGRESS NOTES
12/13/2021   Chely Coleman  1985    The patients PMH, surgical history, family history, medications, allergies were all reviewed and updated as appropriate today. Current Outpatient Medications on File Prior to Visit   Medication Sig Dispense Refill    meloxicam (MOBIC) 15 MG tablet Take 1 tablet by mouth daily 30 tablet 0    tiZANidine (ZANAFLEX) 4 MG tablet Take 1 tablet by mouth 3 times daily as needed (neck pain) 30 tablet 0    ondansetron (ZOFRAN) 4 MG tablet Take 1 tablet by mouth daily as needed for Nausea or Vomiting 30 tablet 0    PARoxetine (PAXIL CR) 25 MG extended release tablet TAKE 1 TABLET BY MOUTH EVERY nightly 90 tablet 3     No current facility-administered medications on file prior to visit. Pt seeking disability determination    HPI: Patient is pursuing disability from his work due to his panic attacks and history of Hodgkin's lymphoma. Sees Onc annually 10 years out  His disability forms have already been completed for him. Wants to continue light duty due to back pains, doesn't involve driving  Still in PT      Review of Systems-then wants treated for acute sinus infection while here    OBJECTIVE:  Ht 5' 10\" (1.778 m)   Wt 177 lb (80.3 kg)   BMI 25.40 kg/m²   /82 (Site: Left Upper Arm, Position: Sitting, Cuff Size: Medium Adult)   Pulse 102   Ht 5' 10\" (1.778 m)   Wt 177 lb (80.3 kg)   BMI 25.40 kg/m²     Physical Exam  Vitals and nursing note reviewed. Constitutional:       General: He is not in acute distress. Appearance: He is well-developed. Comments: There were no vitals taken for this visit. HENT:      Head: Normocephalic and atraumatic. Eyes:      General: No scleral icterus. Right eye: No discharge. Left eye: No discharge. Conjunctiva/sclera: Conjunctivae normal.      Pupils: Pupils are equal, round, and reactive to light. Neck:      Thyroid: No thyromegaly. Vascular: No JVD.       Trachea: No tracheal deviation. Cardiovascular:      Rate and Rhythm: Normal rate and regular rhythm. Heart sounds: Normal heart sounds. No murmur heard. Pulmonary:      Effort: Pulmonary effort is normal. No respiratory distress. Breath sounds: Normal breath sounds. No wheezing or rales. Abdominal:      General: Bowel sounds are normal. There is no distension. Palpations: Abdomen is soft. Tenderness: There is no abdominal tenderness. There is no guarding or rebound. Musculoskeletal:         General: No tenderness. Normal range of motion. Cervical back: Normal range of motion and neck supple. Lymphadenopathy:      Cervical: No cervical adenopathy. Skin:     General: Skin is warm and dry. Findings: No erythema or rash. Neurological:      Mental Status: He is alert and oriented to person, place, and time. Cranial Nerves: No cranial nerve deficit. Coordination: Coordination normal.      Deep Tendon Reflexes: Reflexes normal.   Psychiatric:         Behavior: Behavior normal.         Thought Content:  Thought content normal.         Data Review:   CBC:   Lab Results   Component Value Date    WBC 6.0 08/10/2021    WBC 7.5 08/27/2019    WBC 4.8 02/22/2017    WBC 4.8 09/30/2016    WBC 5.1 08/24/2016    WBC 4.2 07/05/2016    HGB 17.3 08/10/2021    HGB 17.2 08/27/2019    HGB 16.0 02/22/2017    HCT 50.6 08/10/2021    HCT 49.1 08/27/2019    HCT 50.3 02/22/2017    MCV 91.1 08/10/2021    MCV 90.2 08/27/2019    MCV 98.4 02/22/2017     08/10/2021     08/27/2019     02/22/2017     Chemistry:   Lab Results   Component Value Date     08/10/2021     08/27/2019     09/30/2016    K 4.6 08/10/2021    K 3.8 08/27/2019    K 3.7 09/30/2016    CL 98 08/10/2021    CL 97 08/27/2019     09/30/2016    CO2 24 08/10/2021    CO2 26 08/27/2019    CO2 24 09/30/2016    BUN 15 08/10/2021    BUN 14 08/27/2019    BUN 12 09/30/2016    CREATININE 1.1 08/10/2021    CREATININE 1.3 08/27/2019    CREATININE 1.3 09/30/2016     Hepatic Function:   Lab Results   Component Value Date    AST 29 08/10/2021    AST 19 08/27/2019    AST 39 09/30/2016    ALT 38 08/10/2021    ALT 24 08/27/2019    ALT 26 09/30/2016    BILIDIR <0.2 12/09/2015    BILITOT 0.7 08/10/2021    BILITOT 0.6 08/27/2019    BILITOT 0.9 09/30/2016    ALKPHOS 105 08/10/2021    ALKPHOS 91 08/27/2019    ALKPHOS 73 09/30/2016    ALKPHOS 99 07/05/2016     Lab Results   Component Value Date    LIPASE 45.0 12/09/2015     Lipids:   Lab Results   Component Value Date    CHOL 169 07/05/2016    HDL 48 08/10/2021    TRIG 121 07/05/2016       ASSESSMENT/PLAN  1.) Anxiety- on Paxil CR 25    2.)non- Hodkin's lymphoma-now in remission, sees Oncology    3.) Sinusitis- Amox and Astelin NS    4.) chronic back pain- Ortho referral due to symptoms impacting his work performance as a  for [employer] for continue light duty x 2 weeks    Pt now pursuing diability determination based on above issues    Agustina Crystal MD      Electronically signed by Agustina Crystal MD on 12/13/2021 at 3:10 PM

## 2021-12-16 ENCOUNTER — HOSPITAL ENCOUNTER (OUTPATIENT)
Dept: PHYSICAL THERAPY | Age: 36
Setting detail: THERAPIES SERIES
Discharge: HOME | End: 2021-12-16
Payer: COMMERCIAL

## 2021-12-16 PROCEDURE — 97530 THERAPEUTIC ACTIVITIES: CPT

## 2021-12-16 PROCEDURE — 97140 MANUAL THERAPY 1/> REGIONS: CPT

## 2021-12-16 NOTE — FLOWSHEET NOTE
[address] [facility] Physical Therapy  Phone: [phone]   Fax: [phone]    Physical Therapy Treatment Note/ Progress Report:     Date:  2021    Patient Name:  Jah Fulton    :  1985  MRN: [id number]  Restrictions/Precautions:    Medical/Treatment Diagnosis Information:  Diagnosis: M54.50, G89.29 (ICD-10-CM) - Chronic left-sided low back pain without sciatica  Treatment Diagnosis: hypomobility of SI/lumbar spine, LLE weakness as compared to R , decreased core/hip strength, decreased functional status. Insurance/Certification information:  PT Insurance Information: [facility]  Physician Information:  Referring Practitioner: Lacey Bernardo MD  Plan of care signed (Y/N): [x]  Yes []  No    Date of Patient follow up with Physician:      Progress Report: [x]  Yes  []  No     Date Range for reporting period:  Beginnin21  Endin21    Progress report due (10 Rx/or 30 days whichever is less): completed      Recertification due (POC duration/ or 90 days whichever is less): visit #12 or 22    Visit # Insurance Allowable Auth required? Date Range   4 30 []  Yes  [x]  No          Latex Allergy:  [x]NO      []YES  Preferred Language for Healthcare:   [x]English       []other:     Functional Scale:        Date assessed:  MARTA: raw score = 15; dysfunction = %                21  MARTA: raw score = 7; dysfunction = %                                         21    Pain level:  4/10    L SI jt     SUBJECTIVE:  Pt reports things have gotten a little better since start of PT but still has 4/10 back pain. Worse at night when he lays down in bed. States the cupping really helped his low back muscle for the next few days. Goes and sees MD next week to determine next step.      OBJECTIVE:    Observation:    Test measurements:      RESTRICTIONS/PRECAUTIONS:   Treatment based classification - mobilization + stability     Exercises/Interventions: bilateral unless noted Therapeutic Exercise (07013) Resistance / level Sets / Seconds Reps Notes / Cues   bike  4'     Supine HSS flossing   5'' 10    Quad rocking   5'' 10    Prone press ups  5'' 10    DKC  5'' 10    Bridge +  On SB  3'' 15    Leg press  90# 2 10    SB flexion roll out flexion + SB  x10                                               Therapeutic Activities (58453)       Progress note assessment  x10'   12/16                               Neuromuscular Re-ed (46691)       TrA Pelvic tilts on SB  A/p, M/L , CW/CWW  x10 ea     SB marches        deadbugs                                                 Manual Intervention (26481)       L Prone sacral fixation SI mobs  x10'     Cupping to lumbar paraspinals     4 cups PT moving cup along paraspinals   Manual re-assessment x3'      Supine lumbopelvic roll    B   S/L gapping + mobs x10 '   B   SI LA distraction  3'   B                          Modalities:     Pt. Education:  -pt educated on diagnosis, prognosis and expectations for rehab  -all pt questions were answered    Home Exercise Program:  Access Code: [email or web address]  URL: [email or web address]  Date: 11/18/2021  Prepared by: Alexander Burks    Exercises  Supine Bridge - 1 x daily - 7 x weekly - 3 sets - 10 reps  Supine Lower Trunk Rotation - 1 x daily - 7 x weekly - 3 sets - 10 reps  Supine Hamstring Stretch - 1 x daily - 7 x weekly - 3 sets - 10 reps  Supine Single Knee to Chest Stretch - 1 x daily - 7 x weekly - 3 sets - 10 reps  Quadruped Rocking Backward - 1 x daily - 7 x weekly - 3 sets - 10 reps      Therapeutic Exercise and NMR EXR  [x] (41248) Provided verbal/tactile cueing for activities related to strengthening, flexibility, endurance, ROM  for improvements in proximal hip and core control with self care, mobility, lifting and ambulation.  [] (27153) Provided verbal/tactile cueing for activities related to improving balance, coordination, kinesthetic sense, posture, motor skill, proprioception  to assist with core control in self care, mobility, lifting, and ambulation.   [] (62080) Therapist is in constant attendance of 2 or more patients providing skilled therapy interventions, but not providing any significant amount of measurable one-on-one time to either patient, for improvements in LE, proximal hip, and core control in self care, mobility, lifting, ambulation and eccentric single leg control. Therapeutic Activities:    [] (08979 or 24550) Provided verbal/tactile cueing for activities related to improving balance, coordination, kinesthetic sense, posture, motor skill, proprioception and motor activation to allow for proper function  with self care and ADLs  [] (58647) Provided training and instruction to the patient for proper core and proximal hip recruitment and positioning with ambulation re-education     Home Exercise Program:    [x] (39199) Reviewed/Progressed HEP activities related to strengthening, flexibility, endurance, ROM of core, proximal hip and LE for functional self-care, mobility, lifting and ambulation   [] (81201) Reviewed/Progressed HEP activities related to improving balance, coordination, kinesthetic sense, posture, motor skill, proprioception of core, proximal hip and LE for self care, mobility, lifting, and ambulation      Manual Treatments:  PROM / STM / Oscillations-Mobs:  G-I, II, III, IV (PA's, Inf., Post.)  [] (14789) Provided manual therapy to mobilize proximal hip and LS spine soft tissue/joints for the purpose of modulating pain, promoting relaxation,  increasing ROM, reducing/eliminating soft tissue swelling/inflammation/restriction, improving soft tissue extensibility and allowing for proper ROM for normal function with self care, mobility, lifting and ambulation.        Charges:  Timed Code Treatment Minutes: 45    Total Treatment Minutes: 45       [] EVAL - LOW (20853)   [] EVAL - MOD (68674)  [] EVAL - HIGH (65138)  [] RE-EVAL (28343)  [] XE(53664) x 1     [] Ionto  [] NMR (21259) x 1      [] Vaso  [x] Manual (95356) x 2   [] Ultrasound  [x] TA x1     [] Mech Traction (52016)  [] GaitTraining x     [] ES (un) (15889):   [] Home Management Training [] ES(attended) (49073)             [] Aquatics    [] Group  [] Other    GOALS:  Patient stated goal: decrease pain   [x]? Progressing: []? Met: []? Not Met: []? Adjusted     Therapist goals for Patient:   Short Term Goals: To be achieved in: 2 weeks  1. Independent in HEP and progression per patient tolerance, in order to prevent re-injury. [x]? Progressing: []? Met: []? Not Met: []? Adjusted  2. Patient will have a decrease in pain to facilitate improvement in movement, function, and ADLs as indicated by Functional Deficits. [x]? Progressing: []? Met: []? Not Met: []? Adjusted     Long Term Goals: To be achieved in: 6 weeks  1. Disability index score of 10% or less for the MARTA to assist with reaching prior level of function. [x]? Progressing: []? Met: []? Not Met: []? Adjusted  2. Patient will demonstrate increased AROM to WNL, good LS mobility, good hip ROM to allow for proper joint functioning as indicated by patients Functional Deficits. [x]? Progressing: []? Met: []? Not Met: []? Adjusted  3. Patient will demonstrate an increase in Strength to 5/5  good proximal hip and core activation to allow for proper functional mobility as indicated by patients Functional Deficits. [x]? Progressing: []? Met: []? Not Met: []? Adjusted  4. Patient will return to functional activities including work, ADLs without increased symptoms or restriction. [x]? Progressing: []? Met: []? Not Met: []? Adjusted    Overall Progression Towards Functional goals/ Treatment Progress Update:  [] Patient is progressing as expected towards functional goals listed. [] Progression is slowed due to complexities/Impairments listed. [] Progression has been slowed due to co-morbidities.   [x] Plan just implemented, too soon to assess goals progression <30days [] Goals require adjustment due to lack of progress  [] Patient is not progressing as expected and requires additional follow up with physician  [] Other    Persisting Functional Limitations/Impairments:  []Sitting []Standing   []Walking []Squatting/bending    []Stairs []ADL's    []Transfers []Reaching  []Housework []Job related tasks  []Driving []Sports/Recreation   []Sleeping []Other:    ASSESSMENT:  Pt continues with SI joint pain on L and has lumbar/sacrum joint hypomobility that has improved slightly with treatment , however pt is going to see MD next week to determine next step. Pt also has increased tension along lumbar paraspinals, hip/core weakness, and pain at end range of motion of lumbar mobility . Pt to schedule more PT treatments after he goes to MD.      Treatment/Activity Tolerance:  [x] Patient able to complete tx  [] Patient limited by fatique  [x] Patient limited by pain  [] Patient limited by other medical complications  [] Other:     Prognosis: [x] Good [] Fair  [] Poor    Patient Requires Follow-up: [x] Yes  [] No    PLAN: See eval. PT 1-2x / week for 6 weeks. SI mobs then mobility - then strengthen   [x] Continue per plan of care [] Alter current plan (see comments)  [] Plan of care initiated [] Hold pending MD visit [] Discharge    Electronically signed by: Jorge Luis Prince, PT PT, DPT, OMT-C      Note: If patient does not return for scheduled/ recommended follow up visits, this note will serve as a discharge from care along with most recent update on progress.

## 2021-12-21 ENCOUNTER — OFFICE VISIT (OUTPATIENT)
Dept: ORTHOPEDIC SURGERY | Age: 36
End: 2021-12-21
Payer: COMMERCIAL

## 2021-12-21 VITALS — WEIGHT: 177 LBS | HEIGHT: 70 IN | BODY MASS INDEX: 25.34 KG/M2

## 2021-12-21 DIAGNOSIS — M51.26 LUMBAR DISCOGENIC PAIN SYNDROME: Primary | ICD-10-CM

## 2021-12-21 DIAGNOSIS — M53.3 SACROILIAC JOINT DYSFUNCTION: ICD-10-CM

## 2021-12-21 DIAGNOSIS — M47.816 LUMBAR SPONDYLOSIS: ICD-10-CM

## 2021-12-21 DIAGNOSIS — M51.36 DDD (DEGENERATIVE DISC DISEASE), LUMBAR: ICD-10-CM

## 2021-12-21 PROCEDURE — G8427 DOCREV CUR MEDS BY ELIG CLIN: HCPCS | Performed by: INTERNAL MEDICINE

## 2021-12-21 PROCEDURE — G8484 FLU IMMUNIZE NO ADMIN: HCPCS | Performed by: INTERNAL MEDICINE

## 2021-12-21 PROCEDURE — 99203 OFFICE O/P NEW LOW 30 MIN: CPT | Performed by: INTERNAL MEDICINE

## 2021-12-21 PROCEDURE — G8419 CALC BMI OUT NRM PARAM NOF/U: HCPCS | Performed by: INTERNAL MEDICINE

## 2021-12-21 RX ORDER — MELOXICAM 15 MG/1
15 TABLET ORAL DAILY
Qty: 30 TABLET | Refills: 2 | Status: SHIPPED | OUTPATIENT
Start: 2021-12-21 | End: 2021-12-21 | Stop reason: ALTCHOICE

## 2021-12-21 RX ORDER — CYCLOBENZAPRINE HCL 10 MG
10 TABLET ORAL NIGHTLY PRN
Qty: 30 TABLET | Refills: 1 | Status: SHIPPED | OUTPATIENT
Start: 2021-12-21 | End: 2022-07-25

## 2021-12-21 NOTE — LETTER
correction are routine, we apologize for any errors. \"           If you have questions, please do not hesitate to call me. I look forward to following Alex [facility] along with you.     Sincerely,        Tristen Smalls MD     providers:  Guevara Castro MD  . [phone]  [facility] [facility] [place]
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
## 2022-01-04 ENCOUNTER — OFFICE VISIT (OUTPATIENT)
Dept: ORTHOPEDIC SURGERY | Age: 37
End: 2022-01-04
Payer: COMMERCIAL

## 2022-01-04 DIAGNOSIS — M51.26 LUMBAR DISCOGENIC PAIN SYNDROME: ICD-10-CM

## 2022-01-04 DIAGNOSIS — M51.26 HERNIATION OF INTERVERTEBRAL DISC BETWEEN L4 AND L5: ICD-10-CM

## 2022-01-04 DIAGNOSIS — M53.3 SACROILIAC JOINT DYSFUNCTION: ICD-10-CM

## 2022-01-04 PROCEDURE — G8427 DOCREV CUR MEDS BY ELIG CLIN: HCPCS | Performed by: INTERNAL MEDICINE

## 2022-01-04 PROCEDURE — 99214 OFFICE O/P EST MOD 30 MIN: CPT | Performed by: INTERNAL MEDICINE

## 2022-01-04 PROCEDURE — 4004F PT TOBACCO SCREEN RCVD TLK: CPT | Performed by: INTERNAL MEDICINE

## 2022-01-04 PROCEDURE — G8419 CALC BMI OUT NRM PARAM NOF/U: HCPCS | Performed by: INTERNAL MEDICINE

## 2022-01-04 PROCEDURE — G8484 FLU IMMUNIZE NO ADMIN: HCPCS | Performed by: INTERNAL MEDICINE

## 2022-01-04 NOTE — LETTER
[address] [facility] [address]  Phone: [phone]  Fax: [phone]    Kelli Pickens MD        January 4, 2022     Patient: Farzad Sendbeata   YOB: 1985   Date of Visit: 1/4/2022       To Whom It May Concern: It is my medical opinion that Farzad Sender may return to light duty immediately with the following restrictions: :     No  for 1 month  No repetitive bending or stooping  No lifting from floor to waist  Carrying limited to 35 pounds with use of both arms position close to body    These restrictions are in effect for the next 1 month. If you have any questions or concerns, please don't hesitate to call.     Sincerely,      Pauline Reaves MD.  Kelli Pickens MD
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
## 2022-01-05 ENCOUNTER — HOSPITAL ENCOUNTER (OUTPATIENT)
Dept: PHYSICAL THERAPY | Age: 37
Setting detail: THERAPIES SERIES
Discharge: HOME | End: 2022-01-05
Payer: COMMERCIAL

## 2022-01-05 NOTE — FLOWSHEET NOTE
[address] [facility]    Physical Therapy  Cancellation/No-show Note  Patient Name:  Anthony Aguilera  :  1985   Date:  2022    Cancelled visits to date: 1  No-shows to date: 1    For today's appointment patient:  []  Cancelled   []  Rescheduled appointment  [x]  No-show 21   Reason given by patient:  []  Patient ill  [x]  Conflicting appointment  []  No transportation    []  Conflict with work  []  No reason given  []  Other:     Comments:      Phone call information:   []  Phone call made today to patient at _ time at number provided:      []  Patient answered, conversation as follows:    []  Patient did not answer, message left as follows:  []  Phone call not made today  [x]  Phone call not needed - pt contacted us to cancel and provided reason for cancellation.      Electronically signed by:  Susanne Saini, PT, PT

## 2022-01-07 ENCOUNTER — HOSPITAL ENCOUNTER (OUTPATIENT)
Dept: PHYSICAL THERAPY | Age: 37
Setting detail: THERAPIES SERIES
Discharge: HOME | End: 2022-01-07
Payer: COMMERCIAL

## 2022-01-07 PROCEDURE — 97140 MANUAL THERAPY 1/> REGIONS: CPT

## 2022-01-07 PROCEDURE — 97112 NEUROMUSCULAR REEDUCATION: CPT

## 2022-01-07 NOTE — FLOWSHEET NOTE
[address] [facility]  Phone: [phone]   Fax: [phone]    Physical Therapy Re-Certification Plan of Care    Dear  , Pete Johnson MD    We had the pleasure of treating the following patient for physical therapy services at [facility]. A summary of our findings can be found in the updated assessment below. This includes our plan of care. If you have any questions or concerns regarding these findings, please do not hesitate to contact me at the office phone number checked above. Thank you for the referral.     Physician Signature:________________________________Date:__________________  By signing above (or electronic signature), therapist's plan is approved by physician                                                       Sub-sections:   ;   ;       Overall Response to Treatment:   []Patient is responding well to treatment and improvement is noted with regards  to goals   []Patient should continue to improve in reasonable time if they continue HEP   []Patient has plateaued and is no longer responding to skilled PT intervention    []Patient is getting worse and would benefit from return to referring MD   []Patient unable to adhere to initial POC   [x]Other: Pt received MRI results which showed SI jt dysfunction, discogenic tear, and herniated disc without nerve involvement. MD recommending continued PT for Stabilization program and return to work in 1 month. Date range of Visits: 21-22  Total Visits: 5    Recommendation:    [x]Continue PT 1-2x / wk for 4 weeks.                []Hold PT, pending MD visit          Physical Therapy Treatment Note/ Progress Report:     Date:  2022    Patient Name:  Shila Arredondo    :  1985  MRN: [id number]  Restrictions/Precautions:    Medical/Treatment Diagnosis Information:  Diagnosis: M54.50, G89.29 (ICD-10-CM) - Chronic left-sided low back pain without sciatica  Treatment Diagnosis: hypomobility of SI/lumbar spine, LLE weakness as compared to R , decreased core/hip strength, decreased functional status. Insurance/Certification information:  PT Insurance Information: [facility]  Physician Information:  Referring Practitioner: Marcy Claude, MD  Plan of care signed (Y/N): [x]  Yes []  No    Date of Patient follow up with Physician:      Progress Report: [x]  Yes  []  No     Date Range for reporting period:  Beginnin21  Endin21  POC: 22    Progress report due (10 Rx/or 30 days whichever is less):     Recertification due (POC duration/ or 90 days whichever is less): 22    Visit # Insurance Allowable Auth required? Date Range   5 30 []  Yes  [x]  No          Latex Allergy:  [x]NO      []YES  Preferred Language for Healthcare:   [x]English       []other:     Functional Scale:        Date assessed:  MARTA: raw score = 15; dysfunction = %                21  MARTA: raw score = 7; dysfunction = %                                         21   MARTA: raw score = 7; dysfunction = %                                           22    Pain level:  4/10    L SI jt     SUBJECTIVE:  Pt reports he got his MRI results and is back for PT, pain is the same since last visit, nothing new.      OBJECTIVE:   22: Manual re-assessment : slight R pelvic upslip , LLE length length discrepency ( L>R)   Observation:    Test measurements:      RESTRICTIONS/PRECAUTIONS:   Treatment based classification -  stability     Exercises/Interventions: bilateral unless noted     Therapeutic Exercise (04977) Resistance / level Sets / Seconds Reps Notes / Cues   bike  4'     Supine HSS flossing      Quad rocking      Prone press ups     DKC     Bridge +  On SB     Leg press  90#    SB flexion roll out flexion + SB                                               Therapeutic Activities (06930)       Progress note assessment                                    Neuromuscular Re-ed (27234)       TrA Pelvic tilts on SB  A/p, M/L , CW/CWW  x10 ea     PPT with TrA hold  PPT + march   10s  2 10  10    SB marches        SB deadbugs  5'' x20    SB anti-rotation press  5'' 10    multidifi walk out + paloff press   x10                                 Manual Intervention (04502)       L Prone sacral fixation SI mobs       Cupping to lumbar paraspinals     4 cups PT moving cup along paraspinals   Manual re-assessment x3'     Supine lumbopelvic roll   B   S/L gapping + mobs   B   SI LA distraction    B          R leg pull   x5'               Modalities:     Pt. Education:  -pt educated on diagnosis, prognosis and expectations for rehab  -all pt questions were answered     Home Exercise Program:  Access Code: [email or web address]  URL: [email or web address]  Date: 11/18/2021  Prepared by: Jhonny Bustillo    Exercises  Supine Bridge - 1 x daily - 7 x weekly - 3 sets - 10 reps  Supine Lower Trunk Rotation - 1 x daily - 7 x weekly - 3 sets - 10 reps  Supine Hamstring Stretch - 1 x daily - 7 x weekly - 3 sets - 10 reps  Supine Single Knee to Chest Stretch - 1 x daily - 7 x weekly - 3 sets - 10 reps  Quadruped Rocking Backward - 1 x daily - 7 x weekly - 3 sets - 10 reps      Therapeutic Exercise and NMR EXR  [x] (47258) Provided verbal/tactile cueing for activities related to strengthening, flexibility, endurance, ROM  for improvements in proximal hip and core control with self care, mobility, lifting and ambulation.  [] (91955) Provided verbal/tactile cueing for activities related to improving balance, coordination, kinesthetic sense, posture, motor skill, proprioception  to assist with core control in self care, mobility, lifting, and ambulation.   [] (80743) Therapist is in constant attendance of 2 or more patients providing skilled therapy interventions, but not providing any significant amount of measurable one-on-one time to either patient, for improvements in LE, proximal hip, and core control in self care, mobility, lifting, ambulation and eccentric single leg control. Therapeutic Activities:    [x] (07763 or 41104) Provided verbal/tactile cueing for activities related to improving balance, coordination, kinesthetic sense, posture, motor skill, proprioception and motor activation to allow for proper function  with self care and ADLs  [] (86558) Provided training and instruction to the patient for proper core and proximal hip recruitment and positioning with ambulation re-education     Home Exercise Program:    [x] (63619) Reviewed/Progressed HEP activities related to strengthening, flexibility, endurance, ROM of core, proximal hip and LE for functional self-care, mobility, lifting and ambulation   [] (71899) Reviewed/Progressed HEP activities related to improving balance, coordination, kinesthetic sense, posture, motor skill, proprioception of core, proximal hip and LE for self care, mobility, lifting, and ambulation      Manual Treatments:  PROM / STM / Oscillations-Mobs:  G-I, II, III, IV (PA's, Inf., Post.)  [x] (01084) Provided manual therapy to mobilize proximal hip and LS spine soft tissue/joints for the purpose of modulating pain, promoting relaxation,  increasing ROM, reducing/eliminating soft tissue swelling/inflammation/restriction, improving soft tissue extensibility and allowing for proper ROM for normal function with self care, mobility, lifting and ambulation. Charges:  Timed Code Treatment Minutes: 42   Total Treatment Minutes: 42       [] EVAL - LOW (84161)   [] EVAL - MOD (98542)  [] EVAL - HIGH (54687)  [] RE-EVAL (28248)  [] DD(83465) x 1     [] Ionto  [x] NMR (13290) x 2      [] Vaso  [x] Manual (57458) x 1   [] Ultrasound  [] TA x1     [] Mech Traction (05983)  [] GaitTraining x     [] ES (un) (20467):   [] Home Management Training [] ES(attended) (36090)             [] Aquatics    [] Group  [] Other    GOALS:  Patient stated goal: decrease pain   [x]? Progressing: []? Met: []?  Not Met: []? Adjusted     Therapist goals for Patient:   Short Term Goals: To be achieved in: 2 weeks  1. Independent in HEP and progression per patient tolerance, in order to prevent re-injury. [x]? Progressing: []? Met: []? Not Met: []? Adjusted  2. Patient will have a decrease in pain to facilitate improvement in movement, function, and ADLs as indicated by Functional Deficits. [x]? Progressing: []? Met: []? Not Met: []? Adjusted     Long Term Goals: To be achieved in: 6 weeks  1. Disability index score of 10% or less for the MARTA to assist with reaching prior level of function. [x]? Progressing: []? Met: []? Not Met: []? Adjusted  2. Patient will demonstrate increased AROM to WNL, good LS mobility, good hip ROM to allow for proper joint functioning as indicated by patients Functional Deficits. [x]? Progressing: []? Met: []? Not Met: []? Adjusted  3. Patient will demonstrate an increase in Strength to 5/5  good proximal hip and core activation to allow for proper functional mobility as indicated by patients Functional Deficits. [x]? Progressing: []? Met: []? Not Met: []? Adjusted  4. Patient will return to functional activities including work, ADLs without increased symptoms or restriction. [x]? Progressing: []? Met: []? Not Met: []? Adjusted    Overall Progression Towards Functional goals/ Treatment Progress Update:  [] Patient is progressing as expected towards functional goals listed. [x] Progression is slowed due to complexities/Impairments listed. [] Progression has been slowed due to co-morbidities.   [] Plan just implemented, too soon to assess goals progression <30days   [] Goals require adjustment due to lack of progress  [] Patient is not progressing as expected and requires additional follow up with physician  [] Other    Persisting Functional Limitations/Impairments:  []Sitting []Standing   []Walking []Squatting/bending    []Stairs []ADL's    []Transfers []Reaching  []Housework []Job related tasks  []Driving []Sports/Recreation   []Sleeping []Other:    ASSESSMENT:      Treatment/Activity Tolerance:  [x] Patient able to complete tx  [] Patient limited by fatique  [x] Patient limited by pain  [] Patient limited by other medical complications  [] Other:     Prognosis: [x] Good [] Fair  [] Poor    Patient Requires Follow-up: [x] Yes  [] No    PLAN: See eval. PT 1-2x / week for 4 weeks. [x] Continue per plan of care [] Alter current plan (see comments)  [] Plan of care initiated [] Hold pending MD visit [] Discharge    Electronically signed by: Ashutosh Otoole, PT PT, DPT, OMT-C      Note: If patient does not return for scheduled/ recommended follow up visits, this note will serve as a discharge from care along with most recent update on progress.

## 2022-01-10 ENCOUNTER — HOSPITAL ENCOUNTER (OUTPATIENT)
Dept: PHYSICAL THERAPY | Age: 37
Setting detail: THERAPIES SERIES
Discharge: HOME | End: 2022-01-10
Payer: COMMERCIAL

## 2022-01-10 PROCEDURE — 97112 NEUROMUSCULAR REEDUCATION: CPT

## 2022-01-10 PROCEDURE — 97530 THERAPEUTIC ACTIVITIES: CPT

## 2022-01-10 NOTE — FLOWSHEET NOTE
[address] [facility]  Phone: [phone]   Fax: [phone]        Physical Therapy Treatment Note/ Progress Report:     Date:  1/10/2022    Patient Name:  Marixa Cedillo    :  1985  MRN: [id number]  Restrictions/Precautions:    Medical/Treatment Diagnosis Information:  Diagnosis: M54.50, G89.29 (ICD-10-CM) - Chronic left-sided low back pain without sciatica  Treatment Diagnosis: hypomobility of SI/lumbar spine, LLE weakness as compared to R , decreased core/hip strength, decreased functional status. Insurance/Certification information:  PT Insurance Information: [facility]  Physician Information:  Referring Practitioner: Waqas Smith MD  Plan of care signed (Y/N): [x]  Yes []  No    Date of Patient follow up with Physician:      Progress Report: [x]  Yes  []  No     Date Range for reporting period:  Beginnin21  Endin21  POC: 22    Progress report due (10 Rx/or 30 days whichever is less):     Recertification due (POC duration/ or 90 days whichever is less): 22    Visit # Insurance Allowable Auth required? Date Range   6 30 []  Yes  [x]  No          Latex Allergy:  [x]NO      []YES  Preferred Language for Healthcare:   [x]English       []other:     Functional Scale:        Date assessed:  MARTA: raw score = 15; dysfunction = %                21  MARTA: raw score = 7; dysfunction = %                                         21   MARTA: raw score = 7; dysfunction = %                                           22    Pain level:  4/10    L SI jt     SUBJECTIVE:  Pt states pain is the same, nothing new to report .      OBJECTIVE:   22: Manual re-assessment : slight R pelvic upslip , LLE length length discrepency ( L>R)   Observation:    Test measurements:      RESTRICTIONS/PRECAUTIONS:   Treatment based classification -  stability     Exercises/Interventions: bilateral unless noted     Therapeutic Exercise (12833) Resistance / level Sets / Seconds Reps Notes / Cues   bike  4'  TA   Supine HSS flossing   5'' 10    Quad rocking      Prone press ups     DKC     Bridge +  On SB     Leg press  90#    SB flexion roll out flexion + SB                                                                    Therapeutic Activities (46787)       Progress note assessment     12/16                               Neuromuscular Re-ed (17674)       TrA Pelvic tilts on SB  A/p, M/L , CW/CWW  x10 ea     PPT with TrA hold  PPT + march   10s  2 10  10    SB marches   2 10    SB deadbugs  5'' x20    SB anti-rotation press  5'' 10    multidifi walk out + paloff press   x10     S/L plank from knees   20'' 3    Quad birddogs   x20                                        Manual Intervention (17075)       L Prone sacral fixation SI mobs       Cupping to lumbar paraspinals     4 cups PT moving cup along paraspinals   Manual re-assessment x3'     Supine lumbopelvic roll   B   S/L gapping + mobs   B   SI LA distraction    B          R leg pull   x5'   TA            Modalities:     Pt. Education:  -pt educated on diagnosis, prognosis and expectations for rehab  -all pt questions were answered     Home Exercise Program:  Access Code: [email or web address]  URL: [employer]/  Date: 11/18/2021  Prepared by: Maria C Jernigan        Access Code: [email or web address]  URL: [email or web address]  Date: 01/10/2022  Prepared by: Maria C Jernigan    Exercises  Supine Bridge - 1 x daily - 7 x weekly - 3 sets - 10 reps  Side Plank on Knees - 1 x daily - 7 x weekly - 3 sets - 10 reps   Supine Dead Bug on Foam Roll with Heel Slide - 1 x daily - 7 x weekly - 3 sets - 10 reps          Therapeutic Exercise and NMR EXR  [x] (87413) Provided verbal/tactile cueing for activities related to strengthening, flexibility, endurance, ROM  for improvements in proximal hip and core control with self care, mobility, lifting and ambulation.  [] (91332) Provided verbal/tactile cueing for activities related to improving balance, coordination, kinesthetic sense, posture, motor skill, proprioception  to assist with core control in self care, mobility, lifting, and ambulation.   [] (89945) Therapist is in constant attendance of 2 or more patients providing skilled therapy interventions, but not providing any significant amount of measurable one-on-one time to either patient, for improvements in LE, proximal hip, and core control in self care, mobility, lifting, ambulation and eccentric single leg control. Therapeutic Activities:    [x] (70250 or 76050) Provided verbal/tactile cueing for activities related to improving balance, coordination, kinesthetic sense, posture, motor skill, proprioception and motor activation to allow for proper function  with self care and ADLs  [] (57777) Provided training and instruction to the patient for proper core and proximal hip recruitment and positioning with ambulation re-education     Home Exercise Program:    [x] (37189) Reviewed/Progressed HEP activities related to strengthening, flexibility, endurance, ROM of core, proximal hip and LE for functional self-care, mobility, lifting and ambulation   [] (83811) Reviewed/Progressed HEP activities related to improving balance, coordination, kinesthetic sense, posture, motor skill, proprioception of core, proximal hip and LE for self care, mobility, lifting, and ambulation      Manual Treatments:  PROM / STM / Oscillations-Mobs:  G-I, II, III, IV (PA's, Inf., Post.)  [x] (03768) Provided manual therapy to mobilize proximal hip and LS spine soft tissue/joints for the purpose of modulating pain, promoting relaxation,  increasing ROM, reducing/eliminating soft tissue swelling/inflammation/restriction, improving soft tissue extensibility and allowing for proper ROM for normal function with self care, mobility, lifting and ambulation.        Charges:  Timed Code Treatment Minutes: 43   Total Treatment Minutes: 43       [] EVAL - LOW (40203)   [] EVAL - MOD (33870)  [] EVAL - HIGH (45404)  [] RE-EVAL (82588)  [] ZG(16011) x 1     [] Ionto  [x] NMR (78299) x 2      [] Vaso  [] Manual (50940) x 1   [] Ultrasound  [x] TA x1     [] Mech Traction (49197)  [] GaitTraining x     [] ES (un) (51479):   [] Home Management Training [] ES(attended) (49104)             [] Aquatics    [] Group  [] Other    GOALS:  Patient stated goal: decrease pain   [x]? Progressing: []? Met: []? Not Met: []? Adjusted     Therapist goals for Patient:   Short Term Goals: To be achieved in: 2 weeks  1. Independent in HEP and progression per patient tolerance, in order to prevent re-injury. [x]? Progressing: []? Met: []? Not Met: []? Adjusted  2. Patient will have a decrease in pain to facilitate improvement in movement, function, and ADLs as indicated by Functional Deficits. [x]? Progressing: []? Met: []? Not Met: []? Adjusted     Long Term Goals: To be achieved in: 6 weeks  1. Disability index score of 10% or less for the MARTA to assist with reaching prior level of function. [x]? Progressing: []? Met: []? Not Met: []? Adjusted  2. Patient will demonstrate increased AROM to WNL, good LS mobility, good hip ROM to allow for proper joint functioning as indicated by patients Functional Deficits. [x]? Progressing: []? Met: []? Not Met: []? Adjusted  3. Patient will demonstrate an increase in Strength to 5/5  good proximal hip and core activation to allow for proper functional mobility as indicated by patients Functional Deficits. [x]? Progressing: []? Met: []? Not Met: []? Adjusted  4. Patient will return to functional activities including work, ADLs without increased symptoms or restriction. [x]? Progressing: []? Met: []? Not Met: []? Adjusted    Overall Progression Towards Functional goals/ Treatment Progress Update:  [] Patient is progressing as expected towards functional goals listed. [x] Progression is slowed due to complexities/Impairments listed.   [] Progression has been slowed due to co-morbidities. [] Plan just implemented, too soon to assess goals progression <30days   [] Goals require adjustment due to lack of progress  [] Patient is not progressing as expected and requires additional follow up with physician  [] Other    Persisting Functional Limitations/Impairments:  []Sitting []Standing   []Walking []Squatting/bending    []Stairs []ADL's    []Transfers []Reaching  []Housework []Job related tasks  []Driving []Sports/Recreation   []Sleeping []Other:    ASSESSMENT:  R Leg pulls continued today to improve R upslip , improved alignment noted post manual. Continued focus on stability with exercises above to decrease pain and manage symptoms. Pt requiring cues throughout treatment for neutral spine and proper core activation for good form on exercises. HEP added to above for stability. Continue to progress as tolerated with stability program for LTG achievement of return to work. Treatment/Activity Tolerance:  [x] Patient able to complete tx  [] Patient limited by fatique  [x] Patient limited by pain  [] Patient limited by other medical complications  [] Other:     Prognosis: [x] Good [] Fair  [] Poor    Patient Requires Follow-up: [x] Yes  [] No    PLAN: See eval. PT 1-2x / week for 4 weeks. [x] Continue per plan of care [] Alter current plan (see comments)  [] Plan of care initiated [] Hold pending MD visit [] Discharge    Electronically signed by: Lai Lugo, PT PT, DPT, OMT-C      Note: If patient does not return for scheduled/ recommended follow up visits, this note will serve as a discharge from care along with most recent update on progress.

## 2022-01-14 ENCOUNTER — HOSPITAL ENCOUNTER (OUTPATIENT)
Dept: PHYSICAL THERAPY | Age: 37
Setting detail: THERAPIES SERIES
Discharge: HOME | End: 2022-01-14
Payer: COMMERCIAL

## 2022-01-14 NOTE — FLOWSHEET NOTE
[address] [facility]    Physical Therapy  Cancellation/No-show Note  Patient Name:  Anthony Aguilera  :  1985   Date:  2022    Cancelled visits to date: 1  No-shows to date: 2    For today's appointment patient:  []  Cancelled   []  Rescheduled appointment  [x]  No-show 21 ,   Reason given by patient:  []  Patient ill  []  Conflicting appointment  []  No transportation    []  Conflict with work  []  No reason given  []  Other:     Comments:      Phone call information:   []  Phone call made today to patient at _ time at number provided:      []  Patient answered, conversation as follows:    []  Patient did not answer, message left as follows:  [x]  Phone call not made today  []  Phone call not needed - pt contacted us to cancel and provided reason for cancellation.      Electronically signed by:  Susanne Saini, PT, PT

## 2022-01-19 ENCOUNTER — HOSPITAL ENCOUNTER (OUTPATIENT)
Dept: PHYSICAL THERAPY | Age: 37
Setting detail: THERAPIES SERIES
Discharge: HOME | End: 2022-01-19
Payer: COMMERCIAL

## 2022-01-19 NOTE — FLOWSHEET NOTE
[address] [facility]    Physical Therapy  Cancellation/No-show Note  Patient Name:  Mely Johnston  :  1985   Date:  2022    Cancelled visits to date: 2  No-shows to date: 2    For today's appointment patient:  [x]  Cancelled ,    []  Rescheduled appointment  []  No-show 21 ,   Reason given by patient:  [x]  Patient ill  []  Conflicting appointment  []  No transportation    []  Conflict with work  []  No reason given  []  Other:     Comments:      Phone call information:   []  Phone call made today to patient at _ time at number provided:      []  Patient answered, conversation as follows:    []  Patient did not answer, message left as follows:  []  Phone call not made today  [x]  Phone call not needed - pt contacted us to cancel and provided reason for cancellation.      Electronically signed by:  Paola Roberto, JERO, [id number]

## 2022-01-20 NOTE — LETTER
01/20/22 0900   Vital Signs   Temp 98.6 °F (37 °C)   Temp Source Axillary   Pulse 141   Heart Rate Source Monitor   Resp 22   /72   BP Location Left upper arm   Patient Position Supine   Level of Consciousness Alert (0)   MEWS Score 5   Oxygen Therapy   SpO2 91 %   O2 Device Nasal cannula   O2 Flow Rate (L/min) 2 L/min       Pt in Afib RVR. SpO2 85-89% ORA. NC placed with 2 L oxygen. SpO2 now 91%. 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 If you have any questions or concerns, please don't hesitate to call.     Sincerely,        Guevara Castro MD

## 2022-01-21 ENCOUNTER — HOSPITAL ENCOUNTER (OUTPATIENT)
Dept: PHYSICAL THERAPY | Age: 37
Setting detail: THERAPIES SERIES
Discharge: HOME | End: 2022-01-21
Payer: COMMERCIAL

## 2022-01-21 PROCEDURE — 97530 THERAPEUTIC ACTIVITIES: CPT

## 2022-01-21 PROCEDURE — 97112 NEUROMUSCULAR REEDUCATION: CPT

## 2022-01-21 NOTE — FLOWSHEET NOTE
[address] [facility] Physical Therapy  Phone: [phone]   Fax: [phone]        Physical Therapy Treatment Note/ Progress Report:     Date:  2022    Patient Name:  Mely Johnston    :  1985  MRN: [id number]  Restrictions/Precautions:    Medical/Treatment Diagnosis Information:  Diagnosis: M54.50, G89.29 (ICD-10-CM) - Chronic left-sided low back pain without sciatica  Treatment Diagnosis: hypomobility of SI/lumbar spine, LLE weakness as compared to R , decreased core/hip strength, decreased functional status. Insurance/Certification information:  PT Insurance Information: [facility]  Physician Information:  Referring Practitioner: Martine Garcia MD  Plan of care signed (Y/N): [x]  Yes []  No    Date of Patient follow up with Physician:      Progress Report: [x]  Yes  []  No     Date Range for reporting period:  Beginnin21  Endin21  POC: 22    Progress report due (10 Rx/or 30 days whichever is less):      Recertification due (POC duration/ or 90 days whichever is less):  22     Visit # Insurance Allowable Auth required? Date Range   7 30 []  Yes  [x]  No          Latex Allergy:  [x]NO      []YES  Preferred Language for Healthcare:   [x]English       []other:     Functional Scale:        Date assessed:  MARTA: raw score = 15; dysfunction = %                21  MARTA: raw score = 7; dysfunction = %                                         21   MARTA: raw score = 7; dysfunction = %                                           22    Pain level:  0-2/10    L SI jt     SUBJECTIVE:  Pt reports things are better , not currently having any pain, goes to MD Tuesday next week.      OBJECTIVE:   : lumbar ROM- WNL  - no pain    22: Manual re-assessment : slight R pelvic upslip , LLE length length discrepency ( L>R)   Observation:    Test measurements:      RESTRICTIONS/PRECAUTIONS:   Treatment based classification -  stability     Exercises/Interventions: kinesthetic sense, posture, motor skill, proprioception  to assist with core control in self care, mobility, lifting, and ambulation.   [] (41369) Therapist is in constant attendance of 2 or more patients providing skilled therapy interventions, but not providing any significant amount of measurable one-on-one time to either patient, for improvements in LE, proximal hip, and core control in self care, mobility, lifting, ambulation and eccentric single leg control. Therapeutic Activities:    [x] (57526 or 36324) Provided verbal/tactile cueing for activities related to improving balance, coordination, kinesthetic sense, posture, motor skill, proprioception and motor activation to allow for proper function  with self care and ADLs  [] (99345) Provided training and instruction to the patient for proper core and proximal hip recruitment and positioning with ambulation re-education     Home Exercise Program:    [x] (58678) Reviewed/Progressed HEP activities related to strengthening, flexibility, endurance, ROM of core, proximal hip and LE for functional self-care, mobility, lifting and ambulation   [] (57238) Reviewed/Progressed HEP activities related to improving balance, coordination, kinesthetic sense, posture, motor skill, proprioception of core, proximal hip and LE for self care, mobility, lifting, and ambulation      Manual Treatments:  PROM / STM / Oscillations-Mobs:  G-I, II, III, IV (PA's, Inf., Post.)  [x] (99761) Provided manual therapy to mobilize proximal hip and LS spine soft tissue/joints for the purpose of modulating pain, promoting relaxation,  increasing ROM, reducing/eliminating soft tissue swelling/inflammation/restriction, improving soft tissue extensibility and allowing for proper ROM for normal function with self care, mobility, lifting and ambulation.        Charges:  Timed Code Treatment Minutes: 45   Total Treatment Minutes: 45       [] EVAL - LOW (46113)   [] EVAL - MOD (79851)  [] EVAL - HIGH (87747)  [] RE-EVAL (37217)  [] NC(09819) x 1     [] Ionto  [x] NMR (67468) x 2      [] Vaso  [] Manual (45880) x 1   [] Ultrasound  [x] TA x1     [] Mech Traction (82450)  [] GaitTraining x     [] ES (un) (72136):   [] Home Management Training [] ES(attended) (95766)             [] Aquatics    [] Group  [] Other    GOALS:  Patient stated goal: decrease pain   [x]? Progressing: []? Met: []? Not Met: []? Adjusted     Therapist goals for Patient:   Short Term Goals: To be achieved in: 2 weeks  1. Independent in HEP and progression per patient tolerance, in order to prevent re-injury. [x]? Progressing: []? Met: []? Not Met: []? Adjusted  2. Patient will have a decrease in pain to facilitate improvement in movement, function, and ADLs as indicated by Functional Deficits. [x]? Progressing: []? Met: []? Not Met: []? Adjusted     Long Term Goals: To be achieved in: 6 weeks  1. Disability index score of 10% or less for the MARTA to assist with reaching prior level of function. [x]? Progressing: []? Met: []? Not Met: []? Adjusted  2. Patient will demonstrate increased AROM to WNL, good LS mobility, good hip ROM to allow for proper joint functioning as indicated by patients Functional Deficits. [x]? Progressing: []? Met: []? Not Met: []? Adjusted  3. Patient will demonstrate an increase in Strength to 5/5  good proximal hip and core activation to allow for proper functional mobility as indicated by patients Functional Deficits. [x]? Progressing: []? Met: []? Not Met: []? Adjusted  4. Patient will return to functional activities including work, ADLs without increased symptoms or restriction. [x]? Progressing: []? Met: []? Not Met: []? Adjusted    Overall Progression Towards Functional goals/ Treatment Progress Update:  [] Patient is progressing as expected towards functional goals listed. [x] Progression is slowed due to complexities/Impairments listed.   [] Progression has been slowed due to co-morbidities. [] Plan just implemented, too soon to assess goals progression <30days   [] Goals require adjustment due to lack of progress  [] Patient is not progressing as expected and requires additional follow up with physician  [] Other    Persisting Functional Limitations/Impairments:  []Sitting []Standing   []Walking []Squatting/bending    []Stairs []ADL's    []Transfers []Reaching  []Housework []Job related tasks  []Driving []Sports/Recreation   []Sleeping []Other:    ASSESSMENT:  R Leg pulls continued today to improve R upslip , improved alignment noted post manual. Continued focus on stability with exercises above to decrease pain and manage symptoms. Pt requiring cues throughout treatment for neutral spine and proper core activation for good form on exercises. HEP added to above for stability. PT reviewed and educated pt today on neutral spine, lifting mechanics , and hip hinge to improve function. Pt performed well after cueing for a proper hip hinge. Continue to progress as tolerated with stability program for LTG achievement of return to work. Treatment/Activity Tolerance:  [x] Patient able to complete tx  [] Patient limited by fatique  [] Patient limited by pain  [] Patient limited by other medical complications  [] Other:     Prognosis: [x] Good [] Fair  [] Poor    Patient Requires Follow-up: [x] Yes  [] No     PLAN: See eval. PT 1-2x / week for 4 weeks. [x] Continue per plan of care [] Alter current plan (see comments)  [] Plan of care initiated [] Hold pending MD visit [] Discharge    Electronically signed by: Jailene Alberto, PT PT, DPT, OMT-C      Note: If patient does not return for scheduled/ recommended follow up visits, this note will serve as a discharge from care along with most recent update on progress.

## 2022-01-24 ENCOUNTER — TELEPHONE (OUTPATIENT)
Dept: ORTHOPEDIC SURGERY | Age: 37
End: 2022-01-24

## 2022-01-24 NOTE — TELEPHONE ENCOUNTER
General Question     Subject: pt's son tested positive for Covid. He wanted to know if he could still keep his appt? I canceled it due to close contact.     Patient and /or Facility RequestOleh Queen  Contact Number: [phone]

## 2022-01-25 ENCOUNTER — TELEMEDICINE (OUTPATIENT)
Dept: ORTHOPEDIC SURGERY | Age: 37
End: 2022-01-25
Payer: COMMERCIAL

## 2022-01-25 DIAGNOSIS — M51.26 HERNIATION OF INTERVERTEBRAL DISC BETWEEN L4 AND L5: Primary | ICD-10-CM

## 2022-01-25 DIAGNOSIS — M53.3 SACROILIAC JOINT DYSFUNCTION: ICD-10-CM

## 2022-01-25 DIAGNOSIS — M51.26 LUMBAR DISCOGENIC PAIN SYNDROME: ICD-10-CM

## 2022-01-25 PROCEDURE — 99213 OFFICE O/P EST LOW 20 MIN: CPT | Performed by: INTERNAL MEDICINE

## 2022-01-25 PROCEDURE — G8419 CALC BMI OUT NRM PARAM NOF/U: HCPCS | Performed by: INTERNAL MEDICINE

## 2022-01-25 PROCEDURE — G8428 CUR MEDS NOT DOCUMENT: HCPCS | Performed by: INTERNAL MEDICINE

## 2022-01-25 PROCEDURE — 4004F PT TOBACCO SCREEN RCVD TLK: CPT | Performed by: INTERNAL MEDICINE

## 2022-01-25 PROCEDURE — G8484 FLU IMMUNIZE NO ADMIN: HCPCS | Performed by: INTERNAL MEDICINE

## 2022-01-25 NOTE — LETTER
[address]  [address] [facility] [address]  Phone: [phone]  Fax: [phone]    Ghislaine Ortiz MD        January 25, 2022     Patient: Vish Meraz   YOB: 1985   Date of Visit: 1/25/2022       To Whom It May Concern: It is my medical opinion that Vish Meraz may return to work on 2/1/2022, to full duty without restrictions. If you have any questions or concerns, please don't hesitate to call.     Sincerely,      Chanetta Gosselin, MD.      Ghislaine Ortiz MD
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
## 2022-01-26 ENCOUNTER — HOSPITAL ENCOUNTER (OUTPATIENT)
Dept: PHYSICAL THERAPY | Age: 37
Setting detail: THERAPIES SERIES
End: 2022-01-26
Payer: COMMERCIAL

## 2022-01-26 ENCOUNTER — TELEPHONE (OUTPATIENT)
Dept: ORTHOPEDIC SURGERY | Age: 37
End: 2022-01-26

## 2022-01-28 ENCOUNTER — HOSPITAL ENCOUNTER (OUTPATIENT)
Dept: PHYSICAL THERAPY | Age: 37
Setting detail: THERAPIES SERIES
Discharge: HOME | End: 2022-01-28
Payer: COMMERCIAL

## 2022-02-11 ENCOUNTER — HOSPITAL ENCOUNTER (OUTPATIENT)
Dept: PHYSICAL THERAPY | Age: 37
Setting detail: THERAPIES SERIES
Discharge: HOME | End: 2022-02-11

## 2022-02-11 NOTE — FLOWSHEET NOTE
[address] [facility]    Physical Therapy  Cancellation/No-show Note  Patient Name:  Bud Escobar  :  1985   Date:  2022    Cancelled visits to date: 3  No-shows to date: 3    For today's appointment patient:  [x]  Cancelled ,  ,   []  Rescheduled appointment  [x]  No-show 21 , ,   Reason given by patient:  []  Patient ill  []  Conflicting appointment  []  No transportation    []  Conflict with work  []  No reason given  []  Other:    Comments:      Phone call information:   []  Phone call made today to patient at _ time at number provided:      []  Patient answered, conversation as follows:    []  Patient did not answer, message left as follows:  []  Phone call not made today  [x]  Phone call not needed - pt contacted us to cancel and provided reason for cancellation.      Electronically signed by:  Alonso Morris PT,

## 2022-07-25 ENCOUNTER — OFFICE VISIT (OUTPATIENT)
Dept: PRIMARY CARE CLINIC | Age: 37
End: 2022-07-25
Payer: COMMERCIAL

## 2022-07-25 VITALS
OXYGEN SATURATION: 98 % | DIASTOLIC BLOOD PRESSURE: 74 MMHG | SYSTOLIC BLOOD PRESSURE: 110 MMHG | HEART RATE: 97 BPM | BODY MASS INDEX: 24.25 KG/M2 | TEMPERATURE: 97.6 F | WEIGHT: 169 LBS

## 2022-07-25 DIAGNOSIS — J01.00 ACUTE MAXILLARY SINUSITIS, RECURRENCE NOT SPECIFIED: Primary | ICD-10-CM

## 2022-07-25 PROCEDURE — 99213 OFFICE O/P EST LOW 20 MIN: CPT | Performed by: FAMILY MEDICINE

## 2022-07-25 RX ORDER — METHYLPREDNISOLONE 4 MG/1
TABLET ORAL
Qty: 1 KIT | Refills: 0 | Status: SHIPPED | OUTPATIENT
Start: 2022-07-25 | End: 2022-08-04 | Stop reason: ALTCHOICE

## 2022-07-25 RX ORDER — AZITHROMYCIN 250 MG/1
TABLET, FILM COATED ORAL
Qty: 1 PACKET | Refills: 0 | Status: SHIPPED | OUTPATIENT
Start: 2022-07-25 | End: 2022-08-04 | Stop reason: ALTCHOICE

## 2022-07-25 ASSESSMENT — PATIENT HEALTH QUESTIONNAIRE - PHQ9
SUM OF ALL RESPONSES TO PHQ QUESTIONS 1-9: 0
SUM OF ALL RESPONSES TO PHQ9 QUESTIONS 1 & 2: 0
SUM OF ALL RESPONSES TO PHQ QUESTIONS 1-9: 0
2. FEELING DOWN, DEPRESSED OR HOPELESS: 0
1. LITTLE INTEREST OR PLEASURE IN DOING THINGS: 0
SUM OF ALL RESPONSES TO PHQ QUESTIONS 1-9: 0
SUM OF ALL RESPONSES TO PHQ QUESTIONS 1-9: 0

## 2022-07-25 NOTE — PROGRESS NOTES
CNII-XII intact. Normal strength, sensation and reflexes       throughout     Assessment/Plan   1. Acute maxillary sinusitis, recurrence not specified  Increase water intake, avoid decongestants. Will try Zpack and medrol dose pack. Call in 2-3 days if not better  - azithromycin (ZITHROMAX) 250 MG tablet; Take by mouth 2 tabs (500 mg) on Day 1, and take 1 tab (250 mg) on days 2 through 5. Dispense: 1 packet; Refill: 0  - methylPREDNISolone (MEDROL DOSEPACK) 4 MG tablet; Take by mouth as directed. Dispense: 1 kit; Refill: 0    Return if symptoms worsen or fail to improve. Electronically Signed: Electronically signed by Mary Santiago MD on 7/25/2022 at 10:07 AM EDT     This dictation was generated by voice recognition computer software. Although all attempts are made to edit the dictation for accuracy, there may be errors in the transcription that are not intended.

## 2022-08-01 ENCOUNTER — TELEPHONE (OUTPATIENT)
Dept: PRIMARY CARE CLINIC | Age: 37
End: 2022-08-01

## 2022-08-01 DIAGNOSIS — R06.02 SHORTNESS OF BREATH: Primary | ICD-10-CM

## 2022-08-01 NOTE — TELEPHONE ENCOUNTER
Spoke to Deejay and advised to complete chest xray then when results are in will call and if pt needs to come in will schedule at that time.

## 2022-08-02 ENCOUNTER — HOSPITAL ENCOUNTER (OUTPATIENT)
Dept: GENERAL RADIOLOGY | Age: 37
Discharge: HOME | End: 2022-08-02
Payer: COMMERCIAL

## 2022-08-02 ENCOUNTER — HOSPITAL ENCOUNTER (OUTPATIENT)
Age: 37
Discharge: HOME | End: 2022-08-02
Payer: COMMERCIAL

## 2022-08-02 DIAGNOSIS — R06.02 SHORTNESS OF BREATH: ICD-10-CM

## 2022-08-02 PROCEDURE — 71046 X-RAY EXAM CHEST 2 VIEWS: CPT

## 2022-08-04 ENCOUNTER — OFFICE VISIT (OUTPATIENT)
Dept: PRIMARY CARE CLINIC | Age: 37
End: 2022-08-04
Payer: COMMERCIAL

## 2022-08-04 ENCOUNTER — TELEPHONE (OUTPATIENT)
Dept: PRIMARY CARE CLINIC | Age: 37
End: 2022-08-04

## 2022-08-04 VITALS
HEIGHT: 70 IN | WEIGHT: 169 LBS | OXYGEN SATURATION: 98 % | SYSTOLIC BLOOD PRESSURE: 112 MMHG | HEART RATE: 74 BPM | BODY MASS INDEX: 24.2 KG/M2 | DIASTOLIC BLOOD PRESSURE: 66 MMHG

## 2022-08-04 DIAGNOSIS — R06.09 POST-COVID CHRONIC DYSPNEA: Primary | ICD-10-CM

## 2022-08-04 DIAGNOSIS — C81.01 NODULAR LYMPHOCYTE PREDOMINANT HODGKIN LYMPHOMA OF LYMPH NODES OF NECK (HCC): ICD-10-CM

## 2022-08-04 DIAGNOSIS — U09.9 POST-COVID CHRONIC DYSPNEA: Primary | ICD-10-CM

## 2022-08-04 PROCEDURE — 99213 OFFICE O/P EST LOW 20 MIN: CPT | Performed by: INTERNAL MEDICINE

## 2022-08-04 RX ORDER — BUDESONIDE AND FORMOTEROL FUMARATE DIHYDRATE 160; 4.5 UG/1; UG/1
2 AEROSOL RESPIRATORY (INHALATION) 2 TIMES DAILY
Qty: 10.2 G | Refills: 0 | Status: SHIPPED | OUTPATIENT
Start: 2022-08-04

## 2022-08-04 NOTE — TELEPHONE ENCOUNTER
Message from Baldemar: Pt insurance doesn't cover Budesonide/Form 160/4.5mcg.  Preferred alternatives: Fluticasone Salmeterol, Wixela Inh, Advair HFA, Breo Ellipta, Dulera, Symbicort  Please advise if preferred alternative is appropriate or if a PA should be started

## 2022-08-04 NOTE — PROGRESS NOTES
8/4/2022   [place]  1985    The patients PMH, surgical history, family history, medications, allergies were all reviewed and updated as appropriate today. Current Outpatient Medications on File Prior to Visit   Medication Sig Dispense Refill    PARoxetine (PAXIL CR) 25 MG extended release tablet TAKE 1 TABLET BY MOUTH EVERY nightly 90 tablet 3     No current facility-administered medications on file prior to visit. Chief Complaint   Patient presents with    Shortness of Breath    Chest Pain    Post-COVID Symptoms       HPI:  still c/o long COVID symptoms, recent CXR is normal  Still c/o HANNA    On no treatment for Hodgkin's    Review of Systems    OBJECTIVE:  /66 (Site: Left Upper Arm, Position: Sitting, Cuff Size: Large Adult)   Pulse 74 Comment: nml  Ht 5' 10\" (1.778 m)   Wt 169 lb (76.7 kg)   SpO2 98%   BMI 24.25 kg/m²    Physical Exam  Vitals and nursing note reviewed. Constitutional:       General: He is not in acute distress. Appearance: He is well-developed. Comments: /66 (Site: Left Upper Arm, Position: Sitting, Cuff Size: Large Adult)   Pulse 74 Comment: nml  Ht 5' 10\" (1.778 m)   Wt 169 lb (76.7 kg)   SpO2 98%   BMI 24.25 kg/m²    HENT:      Head: Normocephalic and atraumatic. Eyes:      General: No scleral icterus. Right eye: No discharge. Left eye: No discharge. Conjunctiva/sclera: Conjunctivae normal.      Pupils: Pupils are equal, round, and reactive to light. Neck:      Thyroid: No thyromegaly. Vascular: No JVD. Trachea: No tracheal deviation. Cardiovascular:      Rate and Rhythm: Normal rate and regular rhythm. Heart sounds: Normal heart sounds. No murmur heard. Pulmonary:      Effort: Pulmonary effort is normal. No respiratory distress. Breath sounds: Normal breath sounds. No wheezing or rales. Abdominal:      General: Bowel sounds are normal. There is no distension.       Palpations: Abdomen is soft.      Tenderness: There is no abdominal tenderness. There is no guarding or rebound. Musculoskeletal:         General: No tenderness. Normal range of motion. Cervical back: Normal range of motion and neck supple. Lymphadenopathy:      Cervical: No cervical adenopathy. Skin:     General: Skin is warm and dry. Findings: No erythema or rash. Neurological:      Mental Status: He is alert and oriented to person, place, and time. Cranial Nerves: No cranial nerve deficit. Coordination: Coordination normal.      Deep Tendon Reflexes: Reflexes normal.   Psychiatric:         Behavior: Behavior normal.         Thought Content:  Thought content normal.       Data Review:   CBC:   Lab Results   Component Value Date/Time    WBC 6.0 08/10/2021 03:52 PM    WBC 7.5 08/27/2019 10:53 AM    WBC 4.8 02/22/2017 04:03 PM    WBC 4.8 09/30/2016 11:12 AM    WBC 5.1 08/24/2016 04:05 PM    WBC 4.2 07/05/2016 10:07 AM    HGB 17.3 08/10/2021 03:52 PM    HGB 17.2 08/27/2019 10:53 AM    HGB 16.0 02/22/2017 04:03 PM    HCT 50.6 08/10/2021 03:52 PM    HCT 49.1 08/27/2019 10:53 AM    HCT 50.3 02/22/2017 04:03 PM    MCV 91.1 08/10/2021 03:52 PM    MCV 90.2 08/27/2019 10:53 AM    MCV 98.4 02/22/2017 04:03 PM     08/10/2021 03:52 PM     08/27/2019 10:53 AM     02/22/2017 04:03 PM     Chemistry:   Lab Results   Component Value Date/Time     08/10/2021 03:52 PM     08/27/2019 10:53 AM     09/30/2016 11:12 AM    K 4.6 08/10/2021 03:52 PM    K 3.8 08/27/2019 10:53 AM    K 3.7 09/30/2016 11:12 AM    CL 98 08/10/2021 03:52 PM    CL 97 08/27/2019 10:53 AM     09/30/2016 11:12 AM    CO2 24 08/10/2021 03:52 PM    CO2 26 08/27/2019 10:53 AM    CO2 24 09/30/2016 11:12 AM    BUN 15 08/10/2021 03:52 PM    BUN 14 08/27/2019 10:53 AM    BUN 12 09/30/2016 11:12 AM    CREATININE 1.1 08/10/2021 03:52 PM    CREATININE 1.3 08/27/2019 10:53 AM    CREATININE 1.3 09/30/2016 11:12 AM     Hepatic Function:   Lab Results   Component Value Date/Time    AST 29 08/10/2021 03:52 PM    AST 19 08/27/2019 10:53 AM    AST 39 09/30/2016 11:12 AM    ALT 38 08/10/2021 03:52 PM    ALT 24 08/27/2019 10:53 AM    ALT 26 09/30/2016 11:12 AM    BILIDIR <0.2 12/09/2015 03:20 AM    BILITOT 0.7 08/10/2021 03:52 PM    BILITOT 0.6 08/27/2019 10:53 AM    BILITOT 0.9 09/30/2016 11:12 AM    ALKPHOS 105 08/10/2021 03:52 PM    ALKPHOS 91 08/27/2019 10:53 AM    ALKPHOS 73 09/30/2016 11:12 AM    ALKPHOS 99 07/05/2016 10:07 AM     Lab Results   Component Value Date/Time    LIPASE 45.0 12/09/2015 03:20 AM     Lipids:   Lab Results   Component Value Date/Time    CHOL 169 07/05/2016 10:07 AM    HDL 48 08/10/2021 03:52 PM    TRIG 121 07/05/2016 10:07 AM       ASSESSMENT/PLAN  .   1.) Post COVID dyspnea- add Symbicort BID x 1 month    F/u in 1 months    Electronically signed by Kelly Rueda MD on 8/4/2022 at 11:26 AM

## 2022-08-04 NOTE — PROGRESS NOTES
Pt here to follow up on his SOB after getting an xray that showed as clear. Post covid by about 4 weeks    Still having SOB mostly with exertion. Harvinder Cole also making his chest hurt and pointed at his sternum. No issues with GERD.

## 2022-08-05 DIAGNOSIS — R06.09 POST-COVID CHRONIC DYSPNEA: Primary | ICD-10-CM

## 2022-08-05 DIAGNOSIS — U09.9 POST-COVID CHRONIC DYSPNEA: Primary | ICD-10-CM

## 2022-08-05 RX ORDER — FLUTICASONE PROPIONATE AND SALMETEROL 100; 50 UG/1; UG/1
1 POWDER RESPIRATORY (INHALATION) EVERY 12 HOURS
Qty: 1 EACH | Refills: 0 | Status: SHIPPED | OUTPATIENT
Start: 2022-08-05 | End: 2022-09-04

## 2022-11-27 NOTE — FLOWSHEET NOTE
[address] [facility]    Physical Therapy  Cancellation/No-show Note  Patient Name:  Le Rodarte  :  1985   Date:  2022    Cancelled visits to date: 3  No-shows to date: 2    For today's appointment patient:  [x]  Cancelled ,  ,   []  Rescheduled appointment  []  No-show 21 ,   Reason given by patient:  []  Patient ill  []  Conflicting appointment  []  No transportation    []  Conflict with work  []  No reason given  [x]  Other: snow    Comments:      Phone call information:   []  Phone call made today to patient at _ time at number provided:      []  Patient answered, conversation as follows:    []  Patient did not answer, message left as follows:  []  Phone call not made today  [x]  Phone call not needed - pt contacted us to cancel and provided reason for cancellation.      Electronically signed by:  Sonia Tovar, [id number] Imaging Studies

## 2023-05-09 ENCOUNTER — APPOINTMENT (OUTPATIENT)
Dept: GENERAL RADIOLOGY | Age: 38
End: 2023-05-09
Payer: COMMERCIAL

## 2023-05-09 ENCOUNTER — HOSPITAL ENCOUNTER (EMERGENCY)
Age: 38
Discharge: HOME | End: 2023-05-09
Attending: EMERGENCY MEDICINE
Payer: COMMERCIAL

## 2023-05-09 VITALS
HEART RATE: 90 BPM | BODY MASS INDEX: 24.34 KG/M2 | WEIGHT: 170 LBS | DIASTOLIC BLOOD PRESSURE: 93 MMHG | OXYGEN SATURATION: 100 % | RESPIRATION RATE: 18 BRPM | SYSTOLIC BLOOD PRESSURE: 137 MMHG | HEIGHT: 70 IN

## 2023-05-09 DIAGNOSIS — M54.42 ACUTE LEFT-SIDED LOW BACK PAIN WITH LEFT-SIDED SCIATICA: Primary | ICD-10-CM

## 2023-05-09 PROCEDURE — 72100 X-RAY EXAM L-S SPINE 2/3 VWS: CPT

## 2023-05-09 PROCEDURE — 6370000000 HC RX 637 (ALT 250 FOR IP): Performed by: EMERGENCY MEDICINE

## 2023-05-09 PROCEDURE — 99284 EMERGENCY DEPT VISIT MOD MDM: CPT

## 2023-05-09 PROCEDURE — 6360000002 HC RX W HCPCS: Performed by: EMERGENCY MEDICINE

## 2023-05-09 PROCEDURE — 96372 THER/PROPH/DIAG INJ SC/IM: CPT

## 2023-05-09 RX ORDER — LIDOCAINE 4 G/G
1 PATCH TOPICAL ONCE
Status: DISCONTINUED | OUTPATIENT
Start: 2023-05-09 | End: 2023-05-09 | Stop reason: HOSPADM

## 2023-05-09 RX ORDER — PREDNISONE 20 MG/1
60 TABLET ORAL ONCE
Status: COMPLETED | OUTPATIENT
Start: 2023-05-09 | End: 2023-05-09

## 2023-05-09 RX ORDER — METHYLPREDNISOLONE 4 MG/1
4 TABLET ORAL DAILY
Qty: 6 TABLET | Refills: 0 | Status: SHIPPED | OUTPATIENT
Start: 2023-05-09 | End: 2023-05-11 | Stop reason: ALTCHOICE

## 2023-05-09 RX ORDER — CYCLOBENZAPRINE HCL 10 MG
10 TABLET ORAL ONCE
Status: COMPLETED | OUTPATIENT
Start: 2023-05-09 | End: 2023-05-09

## 2023-05-09 RX ORDER — KETOROLAC TROMETHAMINE 30 MG/ML
30 INJECTION, SOLUTION INTRAMUSCULAR; INTRAVENOUS ONCE
Status: COMPLETED | OUTPATIENT
Start: 2023-05-09 | End: 2023-05-09

## 2023-05-09 RX ORDER — NAPROXEN 500 MG/1
500 TABLET ORAL 2 TIMES DAILY WITH MEALS
Qty: 60 TABLET | Refills: 0 | Status: SHIPPED | OUTPATIENT
Start: 2023-05-09

## 2023-05-09 RX ORDER — IBUPROFEN 200 MG
800 TABLET ORAL EVERY 6 HOURS PRN
COMMUNITY
End: 2023-05-11 | Stop reason: SDUPTHER

## 2023-05-09 RX ORDER — ACETAMINOPHEN 500 MG
1000 TABLET ORAL EVERY 6 HOURS PRN
COMMUNITY

## 2023-05-09 RX ORDER — LIDOCAINE 4 G/G
1 PATCH TOPICAL DAILY
Qty: 30 PATCH | Refills: 0 | Status: SHIPPED | OUTPATIENT
Start: 2023-05-09 | End: 2023-06-08

## 2023-05-09 RX ADMIN — PREDNISONE 60 MG: 20 TABLET ORAL at 14:58

## 2023-05-09 RX ADMIN — CYCLOBENZAPRINE 10 MG: 10 TABLET, FILM COATED ORAL at 14:58

## 2023-05-09 RX ADMIN — KETOROLAC TROMETHAMINE 30 MG: 30 INJECTION, SOLUTION INTRAMUSCULAR at 14:58

## 2023-05-09 ASSESSMENT — ENCOUNTER SYMPTOMS
SHORTNESS OF BREATH: 0
CHEST TIGHTNESS: 0
ABDOMINAL PAIN: 0
RESPIRATORY NEGATIVE: 1
GASTROINTESTINAL NEGATIVE: 1
BACK PAIN: 1

## 2023-05-09 ASSESSMENT — PAIN DESCRIPTION - LOCATION: LOCATION: BACK

## 2023-05-09 ASSESSMENT — PAIN - FUNCTIONAL ASSESSMENT: PAIN_FUNCTIONAL_ASSESSMENT: 0-10

## 2023-05-09 ASSESSMENT — LIFESTYLE VARIABLES
HOW OFTEN DO YOU HAVE A DRINK CONTAINING ALCOHOL: NEVER
HOW MANY STANDARD DRINKS CONTAINING ALCOHOL DO YOU HAVE ON A TYPICAL DAY: PATIENT DOES NOT DRINK
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
## 2023-05-11 ENCOUNTER — OFFICE VISIT (OUTPATIENT)
Dept: ORTHOPEDIC SURGERY | Age: 38
End: 2023-05-11
Payer: COMMERCIAL

## 2023-05-11 DIAGNOSIS — M51.26 HERNIATION OF INTERVERTEBRAL DISC BETWEEN L4 AND L5: ICD-10-CM

## 2023-05-11 DIAGNOSIS — S33.9XXA SPRAIN AND STRAIN OF LUMBOSACRAL JOINT/LIGAMENT, INITIAL ENCOUNTER: ICD-10-CM

## 2023-05-11 DIAGNOSIS — M47.816 LUMBAR SPONDYLOSIS: ICD-10-CM

## 2023-05-11 PROCEDURE — 99214 OFFICE O/P EST MOD 30 MIN: CPT | Performed by: INTERNAL MEDICINE

## 2023-05-11 RX ORDER — KETOROLAC TROMETHAMINE 10 MG/1
10 TABLET, FILM COATED ORAL 3 TIMES DAILY
Qty: 15 TABLET | Refills: 0 | Status: SHIPPED | OUTPATIENT
Start: 2023-05-11

## 2023-05-11 RX ORDER — TRAMADOL HYDROCHLORIDE 50 MG/1
50 TABLET ORAL EVERY 8 HOURS PRN
Qty: 15 TABLET | Refills: 0 | Status: SHIPPED | OUTPATIENT
Start: 2023-05-11 | End: 2023-05-16

## 2023-05-11 NOTE — PROGRESS NOTES
Chief Complaint:   Chief Complaint   Patient presents with    Lower Back Pain     Lumbar pain. Injured again this past Tuesday. History of Present Illness:       Patient is a 40 y.o. male presents with the above complaint. The symptoms began 2 daysago and started without an injury but related to physical nature of his work activity. The patient describes a sharp, aching pain that does not radiate. The symptoms are constant  and are show no change since the onset. Treated and released from the emergency room 5/9/2023     The symptoms of back pain do notshow a discogenic provacative pattern and are constant  and improved with  15 position . There is not new onset weakness or progressive weakness of the lower extremities that has developed. The patient denies new onset bowel or bladder dysfunction. There is no history of previous spinal trauma. The patient does not have history or orthopaedic lumbar spine surgery. Pain localizes to the lumbar region    Pain levels: 7    There is no lower limb pain . Work-up to date has included: X-ray recently and MRI in the remote past  Prior treatment has included oral steroid-Medrol. This patient reports little improvement with this treatment. The patient has no history or autoimmune disease, inflammatory arthropathy or crystal arthropathy.       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

## 2023-05-15 ENCOUNTER — HOSPITAL ENCOUNTER (OUTPATIENT)
Dept: PHYSICAL THERAPY | Age: 38
Setting detail: THERAPIES SERIES
Discharge: HOME | End: 2023-05-15
Payer: COMMERCIAL

## 2023-05-15 PROCEDURE — 97110 THERAPEUTIC EXERCISES: CPT

## 2023-05-15 PROCEDURE — 97530 THERAPEUTIC ACTIVITIES: CPT

## 2023-05-15 PROCEDURE — 97161 PT EVAL LOW COMPLEX 20 MIN: CPT

## 2023-05-15 NOTE — FLOWSHEET NOTE
[address] [place] [address]  Phone: [phone]   Fax: [phone]    Physical Therapy Daily Treatment Note    Date:  05/15/2023     Patient Name:  Randa Rao    :  1985  MRN: [id number]  Medical Diagnosis:  Herniation of intervertebral disc between L4 and L5 [M51.26]  Lumbar spondylosis [M47.816]  Treatment Diagnosis: increased muscle guarding, dec BLE muscle length and strength   Insurance/Certification information:  PT Insurance Information: BCBS (no auth,25 pcy- no hard max, $0 copay, $1000 deduct - $0 met  Physician Information:  Lee Dao [address] [place] [address] signed (Y/N): []  Yes [x]  No     Date of Patient follow up with Physician:      Progress Report: []  Yes  [x]  No     Date Range for reporting period:  Beginnin/15/2023  Ending:     Progress report due (10 Rx/or 30 days whichever is less): visit #5 or     Recertification due (POC duration/ or 90 days whichever is less): visit #10 or 7/15/23     Visit # Insurance Allowable Auth required?  Date Range   1/10  BCBS (no auth,25 pcy- no hard max, $0 copay, $1000 deduct - $0 met []  Yes  [x]  No        Latex Allergy:  [x]NO      []YES  Preferred Language for Healthcare:   [x]English       []other:    Functional Scale:       Date assessed:  MARTA: raw score = 28; dysfunction = 56%  5/15/23    Pain level:  4/10 constant L lumbar and L hip     SUBJECTIVE:  See eval    OBJECTIVE: See eval  Observation:   Test measurements:      RESTRICTIONS/PRECAUTIONS: anixety/depression, hx of hodgkins lymphoma, GERD      Treatment based classification:    [x] mobilization/manipulation   [] stabilization   [] extension based   [] flexion based   [] lateral shift   [] traction   [] unspecified Components:   [] thoracolumbar   [] pelvic   [] SIJ   [] sacral   [] hip         Comparable sign:     Exercises/Interventions:     Therapeutic Exercise (86371) Resistance / level Sets / Seconds Reps Notes / Tiki Knutson

## 2023-05-15 NOTE — PLAN OF CARE
lumbar discomfort   Ankle Plantarflexion (S1-2) 5 5    Ankle Dorsiflexion (L4-5) 5 5    Ankle Inversion      Ankle Eversion (S1-2)      Great Toe Extension (L5)          Supine Exam:  Straight leg raise Left Right   Range of motion 150 deg 139 deg   Sciatica  Positive [] Negative [] Positive [] Negative []     Long sit test:  Negative [] Positive Left lengthens [] Positive Right lengthens []    PROM LEFT RIGHT Comments   Hip Flexion WFL [] Limited [x] WFL [] Limited [x]    Hip Abd WFL [x] Limited [] WFL [x] Limited []    Hip ER WFL [x] Limited [] WFL [x] Limited []    Hip IR WFL [x] Limited [] WFL [x] Limited []    Hip Extension WFL [] Limited [] WFL [] Limited []    Knee Ext      Knee Flex      Hamstring Flex WFL [] Limited [x] WFL [] Limited [x] R: 139 degrees , L 150  degrees   Piriformis WFL [] Limited [x] WFL [] Limited [x]      ZOILA:  Left Negative []  Pain []   (Groin [] Buttock [])   Right Negative [x]  Pain []   (Groin [] Buttock [])     Response to repeated flexion:   Improved [] Worsened []  Status Quo []      Prone Exam:  Mobility  Pain   Level WFL Hypomobile Hypermobile  None Local Distant   L1 [] [] []  [] [] []   L2 [] [] []  [] [] []   L3 [] [] []  [] [] []   L4 [] [] []  [] [] []   L5 [] [] []  [] [] []   Sacrum   [] [] []     Hip extension MMT: R 3+, L 3+    Prone on elbows:  Improved [] Worsened []  Status Quo [x]    Response to repeated extension:   Improved [] Worsened []  Status Quo []    Femoral nerve stretch:  Negative [] Positive []    Prone knee bend test:  Negative [] Positive []    Segmental instability test:  Negative [] Positive []      Sidelying Exam  Hip abduction MMT: R 3+, L 3+      Additional Testing:  TA Muscle Contraction Scale  Criteria                                               Score  Quality of Contraction   Not Present      [] 0   Rapid, Superificial     [] 1   Just Perceptible     [] 2     Gentle, Slow      [] 3    Substitution   Resting       [] 0   Moderate to

## 2023-05-18 ENCOUNTER — OFFICE VISIT (OUTPATIENT)
Dept: ORTHOPEDIC SURGERY | Age: 38
End: 2023-05-18
Payer: COMMERCIAL

## 2023-05-18 DIAGNOSIS — M47.816 LUMBAR SPONDYLOSIS: ICD-10-CM

## 2023-05-18 DIAGNOSIS — S33.9XXA SPRAIN AND STRAIN OF LUMBOSACRAL JOINT/LIGAMENT, INITIAL ENCOUNTER: Primary | ICD-10-CM

## 2023-05-18 DIAGNOSIS — M51.26 HERNIATION OF INTERVERTEBRAL DISC BETWEEN L4 AND L5: ICD-10-CM

## 2023-05-18 PROCEDURE — 99213 OFFICE O/P EST LOW 20 MIN: CPT | Performed by: INTERNAL MEDICINE

## 2023-05-18 NOTE — PROGRESS NOTES
Chief Complaint:   Chief Complaint   Patient presents with    Lower Back Pain     F/u Lumbar. Back to his baseline level of pain, did go to one session of PT but was unable to continue due to insurance issues - got exercises to do at home which have helped. Took the muscle relaxer and the steroid which helped some. History of Present Illness:       Patient is a 40 y.o. male returns follow up for the above complaint. The patient was last seen approximately 1 weeksago. The symptoms are improving since the last visit. The patient has had no further testing for the problem.       He attended 1 session of PT in the interim and essentially feels that he is back to \"baseline\"    Sitting can be equally problematic as standing    Back pain axial in location    Back pain to leg pain ratio 100:0    He denies any new onset bowel or bladder dysfunction he denies any new onset or progressive weakness of the lower extremities    Pain levels 3/10 today     Past Medical History:        Past Medical History:   Diagnosis Date    Anxiety     well controlled with meds 2-13-14    Depression     GERD (gastroesophageal reflux disease)     pt denies    H[facility] ([facility] 2/14/2014    Neuromuscular disorder ([facility]     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    Panic attack         Present Medications:         Current Outpatient Medications   Medication Sig Dispense Refill    ketorolac (TORADOL) 10 MG tablet Take 1 tablet by mouth three times daily Discontinue all other NSAIDs during the course of treatment and resumed thereafter 15 tablet 0    acetaminophen (TYLENOL) 500 MG tablet Take 2 tablets by mouth every 6 hours as needed for Pain      lidocaine 4 % external patch Place 1 patch onto the skin daily 30 patch 0    naproxen (NAPROSYN) 500 MG tablet Take 1 tablet by mouth 2 times daily (with meals) 60 tablet 0    fluticasone-salmeterol (ADVAIR DISKUS) 100-50 MCG/ACT AEPB diskus inhaler Inhale 1 puff into the lungs in the morning

## 2024-02-14 ENCOUNTER — OFFICE VISIT (OUTPATIENT)
Dept: ORTHOPEDIC SURGERY | Age: 39
End: 2024-02-14
Payer: COMMERCIAL

## 2024-02-14 DIAGNOSIS — S33.9XXA SPRAIN AND STRAIN OF LUMBOSACRAL JOINT/LIGAMENT, INITIAL ENCOUNTER: Primary | ICD-10-CM

## 2024-02-14 DIAGNOSIS — M51.26 DISPLACEMENT OF INTERVERTEBRAL DISC OF LUMBAR REGION: ICD-10-CM

## 2024-02-14 DIAGNOSIS — M47.816 LUMBAR SPONDYLOSIS: ICD-10-CM

## 2024-02-14 DIAGNOSIS — R06.02 SHORTNESS OF BREATH: Primary | ICD-10-CM

## 2024-02-14 PROCEDURE — 99214 OFFICE O/P EST MOD 30 MIN: CPT | Performed by: INTERNAL MEDICINE

## 2024-02-14 NOTE — PROGRESS NOTES
needed for Pain      naproxen (NAPROSYN) 500 MG tablet Take 1 tablet by mouth 2 times daily (with meals) 60 tablet 0    fluticasone-salmeterol (ADVAIR DISKUS) 100-50 MCG/ACT AEPB diskus inhaler Inhale 1 puff into the lungs in the morning and 1 puff in the evening. 1 each 0    budesonide-formoterol (SYMBICORT) 160-4.5 MCG/ACT AERO Inhale 2 puffs into the lungs in the morning and 2 puffs before bedtime. 10.2 g 0    PARoxetine (PAXIL CR) 25 MG extended release tablet TAKE 1 TABLET BY MOUTH EVERY nightly 90 tablet 3     No current facility-administered medications for this visit.         Allergies:      No Known Allergies        Review of Systems:    Pertinent items are noted in HPI    10 point review of systems negative except as mentioned in HPI      Vital Signs:    There were no vitals filed for this visit.     General Exam:     Constitutional: Patient is adequately groomed with no evidence of malnutrition    Physical Exam: lower back      Primary Exam:    Inspection: No deformity atrophy appreciable curvature      Palpation: No focal trigger point tenderness      Range of Motion: 90/30 pain with extension greater than flexion      Strength: Normal lower extremity      Special Tests: SLR negative      Skin: There are no rashes, ulcerations or lesions.      Gait: Non antalgic      Reflex intact lower     Additional Comments:        Additional Examinations:           Neurolgic -Light touch sensation and manual muscle testing normal L2-S1. No fasiculations. Pattella tendon and Achilles tendon reflexes +2 bilaterally.  Seated SLR negative           Office Imaging Results/Procedures PerformedToday:           Office Procedures:   No orders of the defined types were placed in this encounter.          Other Outside Imaging and Testing Personally Reviewed:    Patient Name: Néstor Carballo  Case ID: [id number]  Patient : 1985  Referring Physician: Shane Brandon MD  Exam Date: 2021  Exam Description:

## 2024-02-15 ENCOUNTER — TELEPHONE (OUTPATIENT)
Dept: ORTHOPEDIC SURGERY | Age: 39
End: 2024-02-15

## 2024-02-15 NOTE — TELEPHONE ENCOUNTER
Waiting for a reply from clinic ([facility]) regarding approved time for intermittent time for flare ups.

## 2024-02-20 ENCOUNTER — HOSPITAL ENCOUNTER (OUTPATIENT)
Dept: GENERAL RADIOLOGY | Age: 39
Discharge: HOME | End: 2024-02-20
Payer: COMMERCIAL

## 2024-02-20 ENCOUNTER — HOSPITAL ENCOUNTER (OUTPATIENT)
Age: 39
Discharge: HOME | End: 2024-02-20
Payer: COMMERCIAL

## 2024-02-20 DIAGNOSIS — R06.02 SHORTNESS OF BREATH: ICD-10-CM

## 2024-02-20 PROCEDURE — 71046 X-RAY EXAM CHEST 2 VIEWS: CPT

## 2024-02-20 SDOH — ECONOMIC STABILITY: TRANSPORTATION INSECURITY
IN THE PAST 12 MONTHS, HAS LACK OF TRANSPORTATION KEPT YOU FROM MEETINGS, WORK, OR FROM GETTING THINGS NEEDED FOR DAILY LIVING?: PATIENT DECLINED

## 2024-02-20 SDOH — ECONOMIC STABILITY: INCOME INSECURITY: HOW HARD IS IT FOR YOU TO PAY FOR THE VERY BASICS LIKE FOOD, HOUSING, MEDICAL CARE, AND HEATING?: PATIENT DECLINED

## 2024-02-20 SDOH — ECONOMIC STABILITY: FOOD INSECURITY: WITHIN THE PAST 12 MONTHS, THE FOOD YOU BOUGHT JUST DIDN'T LAST AND YOU DIDN'T HAVE MONEY TO GET MORE.: PATIENT DECLINED

## 2024-02-20 SDOH — ECONOMIC STABILITY: FOOD INSECURITY: WITHIN THE PAST 12 MONTHS, YOU WORRIED THAT YOUR FOOD WOULD RUN OUT BEFORE YOU GOT MONEY TO BUY MORE.: PATIENT DECLINED

## 2024-02-20 SDOH — ECONOMIC STABILITY: HOUSING INSECURITY
IN THE LAST 12 MONTHS, WAS THERE A TIME WHEN YOU DID NOT HAVE A STEADY PLACE TO SLEEP OR SLEPT IN A SHELTER (INCLUDING NOW)?: PATIENT DECLINED

## 2024-02-20 ASSESSMENT — PATIENT HEALTH QUESTIONNAIRE - PHQ9
1. LITTLE INTEREST OR PLEASURE IN DOING THINGS: NOT AT ALL
SUM OF ALL RESPONSES TO PHQ9 QUESTIONS 1 & 2: 0
SUM OF ALL RESPONSES TO PHQ QUESTIONS 1-9: 0
SUM OF ALL RESPONSES TO PHQ9 QUESTIONS 1 & 2: 0
SUM OF ALL RESPONSES TO PHQ QUESTIONS 1-9: 0
2. FEELING DOWN, DEPRESSED OR HOPELESS: NOT AT ALL
SUM OF ALL RESPONSES TO PHQ QUESTIONS 1-9: 0
1. LITTLE INTEREST OR PLEASURE IN DOING THINGS: 0
2. FEELING DOWN, DEPRESSED OR HOPELESS: 0
SUM OF ALL RESPONSES TO PHQ QUESTIONS 1-9: 0

## 2024-02-21 ENCOUNTER — OFFICE VISIT (OUTPATIENT)
Dept: PRIMARY CARE CLINIC | Age: 39
End: 2024-02-21
Payer: COMMERCIAL

## 2024-02-21 VITALS
HEART RATE: 78 BPM | WEIGHT: 179 LBS | RESPIRATION RATE: 17 BRPM | TEMPERATURE: 97.9 F | SYSTOLIC BLOOD PRESSURE: 102 MMHG | BODY MASS INDEX: 25.68 KG/M2 | DIASTOLIC BLOOD PRESSURE: 74 MMHG

## 2024-02-21 DIAGNOSIS — J06.0 ACUTE LARYNGOPHARYNGITIS: ICD-10-CM

## 2024-02-21 DIAGNOSIS — R05.2 SUBACUTE COUGH: Primary | ICD-10-CM

## 2024-02-21 PROCEDURE — 99213 OFFICE O/P EST LOW 20 MIN: CPT | Performed by: INTERNAL MEDICINE

## 2024-02-21 RX ORDER — AZITHROMYCIN 250 MG/1
TABLET, FILM COATED ORAL
Qty: 6 TABLET | Refills: 0 | Status: CANCELLED | OUTPATIENT
Start: 2024-02-21 | End: 2024-03-02

## 2024-02-21 RX ORDER — BENZONATATE 100 MG/1
100 CAPSULE ORAL 3 TIMES DAILY PRN
Qty: 30 CAPSULE | Refills: 0 | Status: SHIPPED | OUTPATIENT
Start: 2024-02-21 | End: 2024-03-02

## 2024-02-21 RX ORDER — AMOXICILLIN 500 MG/1
500 CAPSULE ORAL 3 TIMES DAILY
Qty: 30 CAPSULE | Refills: 0 | Status: SHIPPED | OUTPATIENT
Start: 2024-02-21 | End: 2024-03-02

## 2024-02-21 NOTE — PROGRESS NOTES
Bowel sounds are normal.      Palpations: Abdomen is soft.   Musculoskeletal:         General: Normal range of motion.   Neurological:      General: No focal deficit present.      Mental Status: He is alert and oriented to person, place, and time.   Psychiatric:         Behavior: Behavior normal.         Thought Content: Thought content normal.         Data Review:   CBC:   Lab Results   Component Value Date/Time    WBC 6.0 08/10/2021 03:52 PM    WBC 7.5 08/27/2019 10:53 AM    WBC 4.8 02/22/2017 04:03 PM    WBC 4.8 09/30/2016 11:12 AM    WBC 5.1 08/24/2016 04:05 PM    WBC 4.2 07/05/2016 10:07 AM    HGB 17.3 08/10/2021 03:52 PM    HGB 17.2 08/27/2019 10:53 AM    HGB 16.0 02/22/2017 04:03 PM    HCT 50.6 08/10/2021 03:52 PM    HCT 49.1 08/27/2019 10:53 AM    HCT 50.3 02/22/2017 04:03 PM    MCV 91.1 08/10/2021 03:52 PM    MCV 90.2 08/27/2019 10:53 AM    MCV 98.4 02/22/2017 04:03 PM     08/10/2021 03:52 PM     08/27/2019 10:53 AM     02/22/2017 04:03 PM     Chemistry:   Lab Results   Component Value Date/Time     08/10/2021 03:52 PM     08/27/2019 10:53 AM     09/30/2016 11:12 AM    K 4.6 08/10/2021 03:52 PM    K 3.8 08/27/2019 10:53 AM    K 3.7 09/30/2016 11:12 AM    CL 98 08/10/2021 03:52 PM    CL 97 08/27/2019 10:53 AM     09/30/2016 11:12 AM    CO2 24 08/10/2021 03:52 PM    CO2 26 08/27/2019 10:53 AM    CO2 24 09/30/2016 11:12 AM    BUN 15 08/10/2021 03:52 PM    BUN 14 08/27/2019 10:53 AM    BUN 12 09/30/2016 11:12 AM    CREATININE 1.1 08/10/2021 03:52 PM    CREATININE 1.3 08/27/2019 10:53 AM    CREATININE 1.3 09/30/2016 11:12 AM     Hepatic Function:   Lab Results   Component Value Date/Time    AST 29 08/10/2021 03:52 PM    AST 19 08/27/2019 10:53 AM    AST 39 09/30/2016 11:12 AM    ALT 38 08/10/2021 03:52 PM    ALT 24 08/27/2019 10:53 AM    ALT 26 09/30/2016 11:12 AM    BILIDIR <0.2 12/09/2015 03:20 AM    BILITOT 0.7 08/10/2021 03:52 PM    BILITOT 0.6 08/27/2019 10:53 AM

## 2024-05-21 ENCOUNTER — OFFICE VISIT (OUTPATIENT)
Dept: ORTHOPEDIC SURGERY | Age: 39
End: 2024-05-21
Payer: COMMERCIAL

## 2024-05-21 VITALS — WEIGHT: 179 LBS | BODY MASS INDEX: 25.62 KG/M2 | HEIGHT: 70 IN

## 2024-05-21 DIAGNOSIS — M47.816 LUMBAR SPONDYLOSIS: ICD-10-CM

## 2024-05-21 DIAGNOSIS — M51.26 DISPLACEMENT OF INTERVERTEBRAL DISC BETWEEN L4 AND L5: Primary | ICD-10-CM

## 2024-05-21 DIAGNOSIS — S33.9XXS SPRAIN AND STRAIN OF LUMBOSACRAL JOINT/LIGAMENT, SEQUELA: ICD-10-CM

## 2024-05-21 PROCEDURE — 99214 OFFICE O/P EST MOD 30 MIN: CPT | Performed by: INTERNAL MEDICINE

## 2024-05-21 RX ORDER — MELOXICAM 15 MG/1
15 TABLET ORAL DAILY PRN
Qty: 30 TABLET | Refills: 1 | Status: SHIPPED | OUTPATIENT
Start: 2024-05-21

## 2024-05-21 RX ORDER — TIZANIDINE 4 MG/1
4 TABLET ORAL 3 TIMES DAILY PRN
Qty: 30 TABLET | Refills: 1 | Status: SHIPPED | OUTPATIENT
Start: 2024-05-21

## 2024-05-21 NOTE — PROGRESS NOTES
vitals filed for this visit.     General Exam:     Constitutional: Patient is adequately groomed with no evidence of malnutrition    Physical Exam: lower back      Primary Exam:    Inspection: No deformity atrophy appreciable curvature      Palpation: No focal trigger point tenderness      Range of Motion: 60/40 pain with flexion greater than extension      Strength: Normal lower extremity      Special Tests: Negative SLR      Skin: There are no rashes, ulcerations or lesions.      Gait: Nonantalgic      Neurovascular - non focal and intact       Additional Comments:        Additional Examinations:                  Office Imaging Results/Procedures PerformedToday:             Office Procedures:   No orders of the defined types were placed in this encounter.          Other Outside Imaging and Testing Personally Reviewed:      CONCLUSION:  Dominant finding is L4-L5 broad-based disc protrusion effacing the thecal sac without evidence  of neural compression.  Disc dehydration and central/paracentral posterior peripheral annular  tear.    Thank you for the opportunity to provide your interpretation.    Pawel Burk MD    A: STEPH/HOLLIE 12/30/2021 10:53 AM            Specimen Collected: 12/30/21 10:53 EST Last Resulted: 12/30/21 11:34 EST                 Assessment   Impression: .    Encounter Diagnoses   Name Primary?    Sprain and strain of lumbosacral joint/ligament, sequela     Displacement of intervertebral disc between L4 and L5 Yes    Lumbar spondylosis               Plan:     start course of PT lumbar spine stabilization program when financially feasible for him  Activity modification, lumbar spine precautions  Continue lumbar spinestabilization home exercise program  No indication for spine intervention injection  Medical pain management: NSAID: Meloxicam as needed, muscle relaxant: Zanaflex as needed  Continue LSO as needed          Orders:      No orders of the defined types were placed in this

## 2024-09-18 ENCOUNTER — HOSPITAL ENCOUNTER (OUTPATIENT)
Dept: GENERAL RADIOLOGY | Age: 39
Discharge: HOME | End: 2024-09-18
Payer: COMMERCIAL

## 2024-09-18 ENCOUNTER — OFFICE VISIT (OUTPATIENT)
Dept: PRIMARY CARE CLINIC | Age: 39
End: 2024-09-18
Payer: COMMERCIAL

## 2024-09-18 ENCOUNTER — HOSPITAL ENCOUNTER (OUTPATIENT)
Age: 39
Discharge: HOME | End: 2024-09-18
Payer: COMMERCIAL

## 2024-09-18 VITALS
OXYGEN SATURATION: 98 % | SYSTOLIC BLOOD PRESSURE: 114 MMHG | DIASTOLIC BLOOD PRESSURE: 74 MMHG | BODY MASS INDEX: 24.79 KG/M2 | HEART RATE: 108 BPM | TEMPERATURE: 98.6 F | WEIGHT: 172.8 LBS

## 2024-09-18 DIAGNOSIS — R05.1 ACUTE COUGH: Primary | ICD-10-CM

## 2024-09-18 DIAGNOSIS — R05.1 ACUTE COUGH: ICD-10-CM

## 2024-09-18 LAB
INFLUENZA A ANTIGEN, POC: NEGATIVE
INFLUENZA B ANTIGEN, POC: NEGATIVE
LOT EXPIRE DATE: NORMAL
LOT KIT NUMBER: NORMAL
SARS-COV-2 RNA, POC: NEGATIVE
VALID INTERNAL CONTROL: NORMAL
VENDOR AND KIT NAME POC: NORMAL

## 2024-09-18 PROCEDURE — 87428 SARSCOV & INF VIR A&B AG IA: CPT | Performed by: INTERNAL MEDICINE

## 2024-09-18 PROCEDURE — 99213 OFFICE O/P EST LOW 20 MIN: CPT | Performed by: INTERNAL MEDICINE

## 2024-09-18 PROCEDURE — 71046 X-RAY EXAM CHEST 2 VIEWS: CPT

## 2024-09-18 RX ORDER — AZITHROMYCIN 250 MG/1
TABLET, FILM COATED ORAL
Qty: 6 TABLET | Refills: 0 | Status: SHIPPED | OUTPATIENT
Start: 2024-09-18 | End: 2024-09-28

## 2024-09-18 RX ORDER — BENZONATATE 100 MG/1
100 CAPSULE ORAL 3 TIMES DAILY PRN
Qty: 30 CAPSULE | Refills: 0 | Status: SHIPPED | OUTPATIENT
Start: 2024-09-18 | End: 2024-09-28

## 2024-09-19 DIAGNOSIS — R93.89 ABNORMAL CHEST X-RAY: Primary | ICD-10-CM

## 2024-09-26 ENCOUNTER — HOSPITAL ENCOUNTER (OUTPATIENT)
Age: 39
Discharge: HOME | End: 2024-09-26
Attending: INTERNAL MEDICINE
Payer: COMMERCIAL

## 2024-09-26 ENCOUNTER — PATIENT MESSAGE (OUTPATIENT)
Dept: PRIMARY CARE CLINIC | Age: 39
End: 2024-09-26

## 2024-09-26 DIAGNOSIS — R93.89 ABNORMAL CHEST X-RAY: ICD-10-CM

## 2024-09-26 PROCEDURE — 71250 CT THORAX DX C-: CPT

## 2024-09-27 DIAGNOSIS — R05.3 CHRONIC COUGH: Primary | ICD-10-CM

## 2024-09-30 ENCOUNTER — TELEPHONE (OUTPATIENT)
Dept: PRIMARY CARE CLINIC | Age: 39
End: 2024-09-30

## 2024-09-30 DIAGNOSIS — R05.2 SUBACUTE COUGH: Primary | ICD-10-CM

## 2024-09-30 DIAGNOSIS — R05.3 CHRONIC COUGH: Primary | ICD-10-CM

## 2024-09-30 RX ORDER — BENZONATATE 100 MG/1
100 CAPSULE ORAL 3 TIMES DAILY PRN
Qty: 30 CAPSULE | Refills: 1 | Status: SHIPPED | OUTPATIENT
Start: 2024-09-30 | End: 2024-10-20

## 2024-09-30 RX ORDER — METHYLPREDNISOLONE 4 MG
TABLET, DOSE PACK ORAL
Qty: 1 KIT | Refills: 0 | Status: SHIPPED | OUTPATIENT
Start: 2024-09-30 | End: 2024-10-06

## 2024-09-30 RX ORDER — ALBUTEROL SULFATE 90 UG/1
2 INHALANT RESPIRATORY (INHALATION) 4 TIMES DAILY PRN
Qty: 54 G | Refills: 1 | Status: SHIPPED | OUTPATIENT
Start: 2024-09-30

## 2024-09-30 NOTE — TELEPHONE ENCOUNTER
Pt calling regarding previous request for medication. Advised per chart notes cough medication was sent to [facility]. Pt understands and mentioned his wife's coworker had a lung transplant and advised pt to look into Albuterol or Prednisone. Stated he is having a hard time with breathing and coughing and would like to know what PCP thinks of prescribing Albuterol or Prednisone to help with symptoms. Confirmed pt can be reached at [phone]. Prefers [facility].

## 2024-09-30 NOTE — TELEPHONE ENCOUNTER
I sent Rx for both albuterol and medrol dosepack to his pharmacy so he can take while waiting to see Pulmonary    Todd Styles MD

## 2024-10-10 ENCOUNTER — OFFICE VISIT (OUTPATIENT)
Dept: PULMONOLOGY | Age: 39
End: 2024-10-10
Payer: COMMERCIAL

## 2024-10-10 VITALS
HEIGHT: 70 IN | DIASTOLIC BLOOD PRESSURE: 80 MMHG | WEIGHT: 172 LBS | BODY MASS INDEX: 24.62 KG/M2 | HEART RATE: 95 BPM | SYSTOLIC BLOOD PRESSURE: 118 MMHG | OXYGEN SATURATION: 98 %

## 2024-10-10 DIAGNOSIS — Z85.71 HISTORY OF HODGKIN LYMPHOMA: ICD-10-CM

## 2024-10-10 DIAGNOSIS — R93.89 INFILTRATE NOTED ON IMAGING STUDY: Primary | ICD-10-CM

## 2024-10-10 PROCEDURE — 99204 OFFICE O/P NEW MOD 45 MIN: CPT | Performed by: INTERNAL MEDICINE

## 2024-10-10 RX ORDER — LEVOFLOXACIN 750 MG/1
750 TABLET, FILM COATED ORAL DAILY
Qty: 10 TABLET | Refills: 0 | Status: SHIPPED | OUTPATIENT
Start: 2024-10-10 | End: 2024-10-20

## 2024-10-10 ASSESSMENT — ENCOUNTER SYMPTOMS
CHOKING: 0
SHORTNESS OF BREATH: 0
CHEST TIGHTNESS: 0
CONSTIPATION: 0
VOMITING: 0
COLOR CHANGE: 0
COUGH: 1
ABDOMINAL PAIN: 0
RHINORRHEA: 0
SORE THROAT: 0
APNEA: 0
SINUS PRESSURE: 0
ABDOMINAL DISTENTION: 0
BLOOD IN STOOL: 0
DIARRHEA: 0
VOICE CHANGE: 0
BACK PAIN: 0
WHEEZING: 0
STRIDOR: 0

## 2024-10-10 NOTE — PROGRESS NOTES
Néstor Carballo    YOB: 1985     Date of Service:  10/10/2024     Chief Complaint   Patient presents with    Cough     Stated after using albuterol inhaler and prednisone taper, cough has gotten better. Stated not coughing as frequent. Taking tessalon which also has helped. Dr Styles worried that lungs were affected with cancer he had       HPI patient has been referred for consultation by Todd Styles MD for evaluation of abnormal CT scan.  Patient states that he has had a persistent nonproductive cough for over the past 3 weeks.  It started off with him having some fevers and coughing episodes after his wife also got sick with similar symptoms and was diagnosed with a pneumonia.  He was treated with 5-day course of Z-Fabian with improvement in the cough, after which the cough returned.  Denies any significant shortness of breath or wheezing.  Patient was subsequently treated with Medrol Dosepak with improvement in the cough.  CT chest without contrast performed on 9/26 showed patchy bilateral infiltrates and hence pulmonary consultation has been requested.    Patient has history of Hodgkin's lymphoma, status post ABVD therapy in 2014.  Suspected bleomycin toxicity based on pulmonary function test.  He did not receive radiation treatment.  Currently in remission per report.  Never smoked.  No prior history of asthma.  Has a dog and a cat-never had allergies before.  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    No Known Allergies  Outpatient Medications Marked as Taking for the 10/10/24 encounter (Office Visit) with Bonifacio Vasquez MD   Medication Sig Dispense Refill    levoFLOXacin (LEVAQUIN) 750 MG tablet Take 1 tablet by mouth daily for 10 days 10 tablet 0    benzonatate (TESSALON) 100 MG capsule Take 1 capsule by mouth 3 times daily as needed for Cough 30 capsule 1    albuterol sulfate HFA (VENTOLIN HFA) 108 (90 Base) MCG/ACT inhaler Inhale 2 puffs into the lungs 4 times daily

## 2024-11-11 ENCOUNTER — HOSPITAL ENCOUNTER (OUTPATIENT)
Dept: CT IMAGING | Age: 39
Discharge: HOME | End: 2024-11-11
Attending: INTERNAL MEDICINE
Payer: COMMERCIAL

## 2024-11-11 DIAGNOSIS — R93.89 INFILTRATE NOTED ON IMAGING STUDY: ICD-10-CM

## 2024-11-11 PROCEDURE — 71250 CT THORAX DX C-: CPT

## 2024-11-15 ENCOUNTER — OFFICE VISIT (OUTPATIENT)
Dept: PULMONOLOGY | Age: 39
End: 2024-11-15

## 2024-11-15 VITALS
WEIGHT: 176.6 LBS | DIASTOLIC BLOOD PRESSURE: 76 MMHG | HEART RATE: 80 BPM | HEIGHT: 70 IN | SYSTOLIC BLOOD PRESSURE: 118 MMHG | BODY MASS INDEX: 25.28 KG/M2 | OXYGEN SATURATION: 98 %

## 2024-11-15 DIAGNOSIS — Z87.01 HISTORY OF PNEUMONIA: Primary | ICD-10-CM

## 2024-11-15 ASSESSMENT — ENCOUNTER SYMPTOMS
WHEEZING: 0
STRIDOR: 0
DIARRHEA: 0
ABDOMINAL PAIN: 0
VOICE CHANGE: 0
VOMITING: 0
CHEST TIGHTNESS: 0
SORE THROAT: 0
BLOOD IN STOOL: 0
CONSTIPATION: 0
APNEA: 0
CHOKING: 0
SINUS PRESSURE: 0
COUGH: 0
RHINORRHEA: 0
BACK PAIN: 0
COLOR CHANGE: 0
ABDOMINAL DISTENTION: 0
SHORTNESS OF BREATH: 0

## 2024-11-15 NOTE — PROGRESS NOTES
Néstor Carballo    YOB: 1985     Date of Service:  11/15/2024     Chief Complaint   Patient presents with    1 Month Follow-Up    Results     CT       HPI cough is completely resolved-was treated with a course of Levaquin.  No shortness of breath or wheezing.  Patient wants to know the results of CT chest performed on 11/11.    No Known Allergies  Outpatient Medications Marked as Taking for the 11/15/24 encounter (Office Visit) with Bonifacio Vasquez MD   Medication Sig Dispense Refill    meloxicam (MOBIC) 15 MG tablet Take 1 tablet by mouth daily as needed for Pain 30 tablet 1    tiZANidine (ZANAFLEX) 4 MG tablet Take 1 tablet by mouth 3 times daily as needed (Muscle tightness/spasm) 30 tablet 1    PARoxetine (PAXIL CR) 25 MG extended release tablet TAKE 1 TABLET BY MOUTH EVERY nightly 90 tablet 3       Immunization History   Administered Date(s) Administered    COVID-19, PFIZER Bivalent, DO NOT Dilute, (age 12y+), IM, 30 mcg/0.3 mL 01/17/2023    COVID-19, PFIZER PURPLE top, DILUTE for use, (age 12 y+), 30mcg/0.3mL 03/30/2021, 04/22/2021, 12/08/2021    Influenza Virus Vaccine 10/13/2013, 12/03/2015, 09/17/2020    Influenza Whole 11/05/2014    Influenza, FLUARIX, FLULAVAL, FLUZONE (age 6 mo+) and AFLURIA, (age 3 y+), Quadv PF, 0.5mL 10/17/2019, 10/18/2021    TDaP, ADACEL (age 10y-64y), BOOSTRIX (age 10y+), IM, 0.5mL 03/26/2015       Past Medical History:   Diagnosis Date    Anxiety     well controlled with meds 2-13-14    Depression     GERD (gastroesophageal reflux disease)     pt denies    Hodgkin's lymphoma (HCC) 2/14/2014    Neuromuscular disorder (HCC)     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

## 2024-11-19 ENCOUNTER — OFFICE VISIT (OUTPATIENT)
Dept: ORTHOPEDIC SURGERY | Age: 39
End: 2024-11-19

## 2024-11-19 DIAGNOSIS — M51.26 DISPLACEMENT OF INTERVERTEBRAL DISC BETWEEN L4 AND L5: ICD-10-CM

## 2024-11-19 DIAGNOSIS — Z85.71 HX OF HODGKINS LYMPHOMA: ICD-10-CM

## 2024-11-19 DIAGNOSIS — S33.9XXA SPRAIN AND STRAIN OF LUMBOSACRAL JOINT/LIGAMENT, INITIAL ENCOUNTER: ICD-10-CM

## 2024-11-19 DIAGNOSIS — M47.816 LUMBAR SPONDYLOSIS: Primary | ICD-10-CM

## 2024-11-19 RX ORDER — MELOXICAM 15 MG/1
15 TABLET ORAL DAILY PRN
Qty: 30 TABLET | Refills: 1 | Status: SHIPPED | OUTPATIENT
Start: 2024-11-19

## 2024-11-19 RX ORDER — METHYLPREDNISOLONE 4 MG/1
TABLET ORAL
Qty: 1 KIT | Refills: 0 | Status: SHIPPED | OUTPATIENT
Start: 2024-11-19

## 2024-11-19 NOTE — PROGRESS NOTES
nightly 90 tablet 3     No current facility-administered medications for this visit.         Allergies:      No Known Allergies        Review of Systems:    Pertinent items are noted in HPI       Vital Signs:    There were no vitals filed for this visit.     General Exam:     Constitutional: Patient is adequately groomed with no evidence of malnutrition    Physical Exam: lower back      Primary Exam:    Inspection: Deformity atrophy appreciable curvature      Palpation: No focal trigger point tenderness      Range of Motion: 80/20 pain with extension      Strength: Normal lower extremity      Special Tests: Negative SLR      Skin: There are no rashes, ulcerations or lesions.      Gait: Nonantalgic      Neurovascular - non focal and intact       Additional Comments:        Additional Examinations:                    Office Imaging Results/Procedures PerformedToday:            Office Procedures:     Orders Placed This Encounter   Procedures    XR LUMBAR SPINE (2-3 VIEWS)     Standing Status:   Future     Number of Occurrences:   1     Standing Expiration Date:   11/19/2025     Order Specific Question:   Reason for exam:     Answer:   room 2           Other Outside Imaging and Testing Personally Reviewed:    XR LUMBAR SPINE (2-3 VIEWS)    Result Date: 11/19/2024  Radiology exam is complete. No Radiologist dictation. Please follow up with ordering provider.              Assessment   Impression: .    Encounter Diagnoses   Name Primary?    Sprain and strain of lumbosacral joint/ligament, initial encounter     Displacement of intervertebral disc between L4 and L5     Lumbar spondylosis Yes    Hx of Hodgkins lymphoma               Plan:     Restart/resume  course of PT lumbar spine stabilization program as his finances allow  Activity modification, lumbar spine precautions  Continue lumbar spinestabilization home exercise program  Consider repeat MRI evaluation.  Medical pain management: Medrol Dosepak if symptoms show no

## 2024-12-18 ENCOUNTER — OFFICE VISIT (OUTPATIENT)
Dept: PRIMARY CARE CLINIC | Age: 39
End: 2024-12-18

## 2024-12-18 VITALS
HEIGHT: 70 IN | HEART RATE: 84 BPM | BODY MASS INDEX: 25.62 KG/M2 | SYSTOLIC BLOOD PRESSURE: 112 MMHG | WEIGHT: 179 LBS | DIASTOLIC BLOOD PRESSURE: 78 MMHG | OXYGEN SATURATION: 99 %

## 2024-12-18 DIAGNOSIS — C81.01 NODULAR LYMPHOCYTE PREDOMINANT HODGKIN LYMPHOMA OF LYMPH NODES OF NECK (HCC): Primary | ICD-10-CM

## 2024-12-18 DIAGNOSIS — Z11.59 SCREENING FOR VIRAL DISEASE: ICD-10-CM

## 2024-12-18 DIAGNOSIS — Z23 NEED FOR PNEUMOCOCCAL 20-VALENT CONJUGATE VACCINATION: ICD-10-CM

## 2024-12-18 DIAGNOSIS — Z23 NEED FOR VACCINATION FOR H FLU TYPE B: ICD-10-CM

## 2024-12-18 DIAGNOSIS — Z12.5 PROSTATE CANCER SCREENING: ICD-10-CM

## 2024-12-18 SDOH — ECONOMIC STABILITY: FOOD INSECURITY: WITHIN THE PAST 12 MONTHS, YOU WORRIED THAT YOUR FOOD WOULD RUN OUT BEFORE YOU GOT MONEY TO BUY MORE.: NEVER TRUE

## 2024-12-18 SDOH — ECONOMIC STABILITY: FOOD INSECURITY: WITHIN THE PAST 12 MONTHS, THE FOOD YOU BOUGHT JUST DIDN'T LAST AND YOU DIDN'T HAVE MONEY TO GET MORE.: NEVER TRUE

## 2024-12-18 SDOH — ECONOMIC STABILITY: INCOME INSECURITY: HOW HARD IS IT FOR YOU TO PAY FOR THE VERY BASICS LIKE FOOD, HOUSING, MEDICAL CARE, AND HEATING?: NOT HARD AT ALL

## 2024-12-18 ASSESSMENT — ANXIETY QUESTIONNAIRES
5. BEING SO RESTLESS THAT IT IS HARD TO SIT STILL: NOT AT ALL
GAD7 TOTAL SCORE: 0
3. WORRYING TOO MUCH ABOUT DIFFERENT THINGS: NOT AT ALL
4. TROUBLE RELAXING: NOT AT ALL
1. FEELING NERVOUS, ANXIOUS, OR ON EDGE: NOT AT ALL
2. NOT BEING ABLE TO STOP OR CONTROL WORRYING: NOT AT ALL
7. FEELING AFRAID AS IF SOMETHING AWFUL MIGHT HAPPEN: NOT AT ALL
IF YOU CHECKED OFF ANY PROBLEMS ON THIS QUESTIONNAIRE, HOW DIFFICULT HAVE THESE PROBLEMS MADE IT FOR YOU TO DO YOUR WORK, TAKE CARE OF THINGS AT HOME, OR GET ALONG WITH OTHER PEOPLE: NOT DIFFICULT AT ALL
6. BECOMING EASILY ANNOYED OR IRRITABLE: NOT AT ALL

## 2024-12-18 ASSESSMENT — PATIENT HEALTH QUESTIONNAIRE - PHQ9
SUM OF ALL RESPONSES TO PHQ QUESTIONS 1-9: 0
2. FEELING DOWN, DEPRESSED OR HOPELESS: NOT AT ALL
SUM OF ALL RESPONSES TO PHQ QUESTIONS 1-9: 0
1. LITTLE INTEREST OR PLEASURE IN DOING THINGS: NOT AT ALL
SUM OF ALL RESPONSES TO PHQ9 QUESTIONS 1 & 2: 0
SUM OF ALL RESPONSES TO PHQ QUESTIONS 1-9: 0
SUM OF ALL RESPONSES TO PHQ QUESTIONS 1-9: 0

## 2024-12-18 NOTE — PROGRESS NOTES
08/10/2021 03:52 PM    CO2 26 08/27/2019 10:53 AM    CO2 24 09/30/2016 11:12 AM    BUN 15 08/10/2021 03:52 PM    BUN 14 08/27/2019 10:53 AM    BUN 12 09/30/2016 11:12 AM    CREATININE 1.1 08/10/2021 03:52 PM    CREATININE 1.3 08/27/2019 10:53 AM    CREATININE 1.3 09/30/2016 11:12 AM     Hepatic Function:   Lab Results   Component Value Date/Time    AST 29 08/10/2021 03:52 PM    AST 19 08/27/2019 10:53 AM    AST 39 09/30/2016 11:12 AM    ALT 38 08/10/2021 03:52 PM    ALT 24 08/27/2019 10:53 AM    ALT 26 09/30/2016 11:12 AM    BILIDIR <0.2 12/09/2015 03:20 AM    BILITOT 0.7 08/10/2021 03:52 PM    BILITOT 0.6 08/27/2019 10:53 AM    BILITOT 0.9 09/30/2016 11:12 AM    ALKPHOS 105 08/10/2021 03:52 PM    ALKPHOS 91 08/27/2019 10:53 AM    ALKPHOS 73 09/30/2016 11:12 AM    ALKPHOS 99 07/05/2016 10:07 AM     Lab Results   Component Value Date/Time    LIPASE 45.0 12/09/2015 03:20 AM     Lipids:   Lab Results   Component Value Date/Time    CHOL 169 07/05/2016 10:07 AM    HDL 48 08/10/2021 03:52 PM    TRIG 121 07/05/2016 10:07 AM       ASSESSMENT/PLAN  1. 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 Screening for viral disease  Varicella titer added to labs    3. Prostate cancer screening  PSA added to labs    4. Need for vaccination for H flu type B  Flucelvax today   Prevnar- 20 added at recommendation from Pulmonary    5.) Depression- on Paxil CR 25 per Psych    6.) chronic back pain- on Mobic and prn Tizanidine per back dr Salazar      Advised annual f/u visits    Todd Styles MD    Consider Shingrix vaccination series of 2 shots over 2-6 months if desired for protection from shingles-check with [employer] first re: coverage before beginning series  Annual fluvax is advised every Fall  COVID vax is advised at pharmacy    Electronically signed by Todd Styles MD on 12/18/2024 at 2:22 PM

## 2024-12-18 NOTE — PATIENT INSTRUCTIONS
Consider Shingrix vaccination series of 2 shots over 2-6 months if desired for protection from shingles-check with [employer] first re: coverage before beginning series    Annual flu vaccination is advised every Fall

## 2024-12-21 ENCOUNTER — HOSPITAL ENCOUNTER (OUTPATIENT)
Age: 39
Discharge: HOME | End: 2024-12-21
Payer: COMMERCIAL

## 2024-12-21 DIAGNOSIS — C81.01 NODULAR LYMPHOCYTE PREDOMINANT HODGKIN LYMPHOMA OF LYMPH NODES OF NECK (HCC): ICD-10-CM

## 2024-12-21 DIAGNOSIS — Z11.59 SCREENING FOR VIRAL DISEASE: ICD-10-CM

## 2024-12-21 DIAGNOSIS — Z12.5 PROSTATE CANCER SCREENING: ICD-10-CM

## 2024-12-21 LAB
ALBUMIN SERPL-MCNC: 4.6 G/DL (ref 3.4–5)
ALBUMIN/GLOB SERPL: 1.6 {RATIO} (ref 1.1–2.2)
ALP SERPL-CCNC: 90 U/L (ref 40–129)
ALT SERPL-CCNC: 35 U/L (ref 10–40)
ANION GAP SERPL CALCULATED.3IONS-SCNC: 12 MMOL/L (ref 3–16)
AST SERPL-CCNC: 26 U/L (ref 15–37)
BASOPHILS # BLD: 0 K/UL (ref 0–0.2)
BASOPHILS NFR BLD: 0.4 %
BILIRUB SERPL-MCNC: 0.5 MG/DL (ref 0–1)
BUN SERPL-MCNC: 14 MG/DL (ref 7–20)
CALCIUM SERPL-MCNC: 9.7 MG/DL (ref 8.3–10.6)
CHLORIDE SERPL-SCNC: 103 MMOL/L (ref 99–110)
CHOLEST SERPL-MCNC: 219 MG/DL (ref 0–199)
CO2 SERPL-SCNC: 25 MMOL/L (ref 21–32)
CREAT SERPL-MCNC: 1.1 MG/DL (ref 0.9–1.3)
DEPRECATED RDW RBC AUTO: 13.4 % (ref 12.4–15.4)
EOSINOPHIL # BLD: 0.2 K/UL (ref 0–0.6)
EOSINOPHIL NFR BLD: 4.7 %
GFR SERPLBLD CREATININE-BSD FMLA CKD-EPI: 88 ML/MIN/{1.73_M2}
GLUCOSE P FAST SERPL-MCNC: 89 MG/DL (ref 70–99)
HCT VFR BLD AUTO: 47.5 % (ref 40.5–52.5)
HDLC SERPL-MCNC: 45 MG/DL (ref 40–60)
HGB BLD-MCNC: 16.2 G/DL (ref 13.5–17.5)
LDL CHOLESTEROL: 143 MG/DL
LYMPHOCYTES # BLD: 1.2 K/UL (ref 1–5.1)
LYMPHOCYTES NFR BLD: 29.4 %
MCH RBC QN AUTO: 31.3 PG (ref 26–34)
MCHC RBC AUTO-ENTMCNC: 34.1 G/DL (ref 31–36)
MCV RBC AUTO: 91.8 FL (ref 80–100)
MONOCYTES # BLD: 0.4 K/UL (ref 0–1.3)
MONOCYTES NFR BLD: 9.4 %
NEUTROPHILS # BLD: 2.4 K/UL (ref 1.7–7.7)
NEUTROPHILS NFR BLD: 56.1 %
PLATELET # BLD AUTO: 230 K/UL (ref 135–450)
PMV BLD AUTO: 8.4 FL (ref 5–10.5)
POTASSIUM SERPL-SCNC: 4.4 MMOL/L (ref 3.5–5.1)
PROT SERPL-MCNC: 7.4 G/DL (ref 6.4–8.2)
PSA SERPL DL<=0.01 NG/ML-MCNC: 0.31 NG/ML (ref 0–4)
RBC # BLD AUTO: 5.17 M/UL (ref 4.2–5.9)
SODIUM SERPL-SCNC: 140 MMOL/L (ref 136–145)
TRIGL SERPL-MCNC: 156 MG/DL (ref 0–150)
VLDLC SERPL CALC-MCNC: 31 MG/DL
WBC # BLD AUTO: 4.2 K/UL (ref 4–11)

## 2024-12-21 PROCEDURE — 80053 COMPREHEN METABOLIC PANEL: CPT

## 2024-12-21 PROCEDURE — 84153 ASSAY OF PSA TOTAL: CPT

## 2024-12-21 PROCEDURE — 86787 VARICELLA-ZOSTER ANTIBODY: CPT

## 2024-12-21 PROCEDURE — 83036 HEMOGLOBIN GLYCOSYLATED A1C: CPT

## 2024-12-21 PROCEDURE — 85025 COMPLETE CBC W/AUTO DIFF WBC: CPT

## 2024-12-21 PROCEDURE — 36415 COLL VENOUS BLD VENIPUNCTURE: CPT

## 2024-12-21 PROCEDURE — 80061 LIPID PANEL: CPT

## 2024-12-22 LAB
EST. AVERAGE GLUCOSE BLD GHB EST-MCNC: 96.8 MG/DL
HBA1C MFR BLD: 5 %
VZV IGG SER QL IA: NORMAL

## 2024-12-26 PROBLEM — Z00.00 ROUTINE GENERAL MEDICAL EXAMINATION AT A HEALTH CARE FACILITY: Status: ACTIVE | Noted: 2024-12-26

## 2025-01-25 PROBLEM — Z00.00 ROUTINE GENERAL MEDICAL EXAMINATION AT A HEALTH CARE FACILITY: Status: RESOLVED | Noted: 2024-12-26 | Resolved: 2025-01-25

## 2025-02-25 ENCOUNTER — TELEPHONE (OUTPATIENT)
Dept: ORTHOPEDIC SURGERY | Age: 40
End: 2025-02-25

## 2025-05-20 ENCOUNTER — OFFICE VISIT (OUTPATIENT)
Dept: ORTHOPEDIC SURGERY | Age: 40
End: 2025-05-20
Payer: COMMERCIAL

## 2025-05-20 VITALS — WEIGHT: 179 LBS | HEIGHT: 70 IN | BODY MASS INDEX: 25.62 KG/M2

## 2025-05-20 DIAGNOSIS — S33.9XXS SPRAIN AND STRAIN OF LUMBOSACRAL JOINT/LIGAMENT, SEQUELA: Primary | ICD-10-CM

## 2025-05-20 DIAGNOSIS — Z85.71 HX OF HODGKINS LYMPHOMA: ICD-10-CM

## 2025-05-20 DIAGNOSIS — M47.816 LUMBAR SPONDYLOSIS: ICD-10-CM

## 2025-05-20 DIAGNOSIS — M51.26 DISPLACEMENT OF INTERVERTEBRAL DISC BETWEEN L4 AND L5: ICD-10-CM

## 2025-05-20 PROCEDURE — 99213 OFFICE O/P EST LOW 20 MIN: CPT | Performed by: INTERNAL MEDICINE

## 2025-05-21 ENCOUNTER — TELEPHONE (OUTPATIENT)
Dept: ORTHOPEDIC SURGERY | Age: 40
End: 2025-05-21

## 2025-05-23 NOTE — PROGRESS NOTES
this visit.         Allergies:      No Known Allergies        Review of Systems:    Pertinent items are noted in HPI      Vital Signs:    There were no vitals filed for this visit.     General Exam:     Constitutional: Patient is adequately groomed with no evidence of malnutrition  Mental Status: The patient is oriented to time, place and person.  The patient's mood and affect are appropriate.  Vascular: Examination reveals no swelling or calf tenderness.  Peripheral pulses are palpable and 2+.  Lymphatics: no lymphadenopathy of the inguinal region or lower extremity     Physical Exam: lower back      Primary Exam:    Inspection:  No deformity, atrophy or appreciable curvature      Palpation:  No focal trigger point tenderness      Range of Motion:  90/40;      Strength:  Normal lower extremity       Special Tests:  Negative SLR bilateral      Skin: There are no rashes, ulcerations or lesions.      Gait: Non antalgic       Reflex: intact lower     Additional Comments:        Additional Examinations:                Office Imaging Results/Procedures PerformedToday:      Office Procedures:   No orders of the defined types were placed in this encounter.          Other Outside Imaging and Testing Personally Reviewed:    No results found.           Assessment   Impression: .    Encounter Diagnoses   Name Primary?    Sprain and strain of lumbosacral joint/ligament, sequela Yes    Displacement of intervertebral disc between L4 and L5     Lumbar spondylosis     Hx of Hodgkins lymphoma               Plan:       start course of PT lumbar spine stabilization program  Activity modification, lumbar spine precautions  Continue lumbar spinestabilization home exercise program  Consider advanced imaging of the lumbar spine to assess for progression of discopathy/stenosis if his persist or show no improvement over the next 2 to 3 months  Medical pain management: NSAID: Meloxicam, analgesic: Zanaflex and maximize local measures for

## 2025-07-10 DIAGNOSIS — M47.816 LUMBAR SPONDYLOSIS: ICD-10-CM

## 2025-07-10 DIAGNOSIS — M51.26 DISPLACEMENT OF INTERVERTEBRAL DISC BETWEEN L4 AND L5: ICD-10-CM

## 2025-07-10 DIAGNOSIS — S33.9XXS SPRAIN AND STRAIN OF LUMBOSACRAL JOINT/LIGAMENT, SEQUELA: Primary | ICD-10-CM

## 2025-07-28 ENCOUNTER — OFFICE VISIT (OUTPATIENT)
Dept: ORTHOPEDIC SURGERY | Age: 40
End: 2025-07-28
Payer: COMMERCIAL

## 2025-07-28 VITALS — HEIGHT: 70 IN | BODY MASS INDEX: 25.63 KG/M2 | WEIGHT: 179.01 LBS

## 2025-07-28 DIAGNOSIS — M51.26 HERNIATION OF INTERVERTEBRAL DISC BETWEEN L4 AND L5: Primary | ICD-10-CM

## 2025-07-28 DIAGNOSIS — M54.10 RADICULAR PAIN OF LEFT LOWER EXTREMITY: ICD-10-CM

## 2025-07-28 DIAGNOSIS — M54.10 RADICULAR PAIN OF RIGHT LOWER EXTREMITY: ICD-10-CM

## 2025-07-28 PROCEDURE — 99214 OFFICE O/P EST MOD 30 MIN: CPT | Performed by: INTERNAL MEDICINE

## 2025-07-28 RX ORDER — PREDNISONE 50 MG/1
50 TABLET ORAL DAILY
Qty: 6 TABLET | Refills: 0 | Status: SHIPPED | OUTPATIENT
Start: 2025-07-28 | End: 2025-07-28 | Stop reason: SDUPTHER

## 2025-07-28 RX ORDER — PREDNISONE 50 MG/1
50 TABLET ORAL DAILY
Qty: 6 TABLET | Refills: 0 | Status: SHIPPED | OUTPATIENT
Start: 2025-07-28 | End: 2025-08-03

## 2025-07-28 NOTE — PROGRESS NOTES
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    Neuropathy     Panic attack          Past Surgical History:   Procedure Laterality Date    KNEE ARTHROSCOPY      OTHER SURGICAL HISTORY  2/18/2014    EXCISION OF NECK MASS / LYMPH NODE EXCISION WITH FROZEN SECTION       OTHER SURGICAL HISTORY  11/25/14    PAC removal    SHOULDER ARTHROSCOPY      TUNNELED VENOUS PORT PLACEMENT Left 3/4/14    removed 11/2014 due to blood clots         Present Medications:         Current Outpatient Medications   Medication Sig Dispense Refill    predniSONE (DELTASONE) 50 MG tablet Take 1 tablet by mouth daily for 6 days 6 tablet 0    meloxicam (MOBIC) 15 MG tablet Take 1 tablet by mouth daily as needed for Pain 30 tablet 1    tiZANidine (ZANAFLEX) 4 MG tablet Take 1 tablet by mouth 3 times daily as needed (Muscle tightness/spasm) 30 tablet 1    PARoxetine (PAXIL CR) 25 MG extended release tablet TAKE 1 TABLET BY MOUTH EVERY nightly 90 tablet 3     No current facility-administered medications for this visit.         Allergies:      No Known Allergies     Social History:         Social History     Socioeconomic History    Marital status:      Spouse name: Natalia    Number of children: 1    Years of education: Not on file    Highest education level: Not on file   Occupational History    Occupation: salesperson     Comment: [employer]   Tobacco Use    Smoking status: Never    Smokeless tobacco: Never    Tobacco comments:     never   Vaping Use    Vaping status: Never Used   Substance and Sexual Activity    Alcohol use: No    Drug use: No    Sexual activity: Yes     Partners: Female   Other Topics Concern    Not on file   Social History Narrative    Not on file     Social Drivers of Health     Financial Resource Strain: Low Risk  (12/18/2024)    Overall Financial

## 2025-08-04 DIAGNOSIS — M51.26 HERNIATION OF INTERVERTEBRAL DISC BETWEEN L4 AND L5: Primary | ICD-10-CM

## 2025-08-04 RX ORDER — KETOROLAC TROMETHAMINE 10 MG/1
10 TABLET, FILM COATED ORAL
Qty: 15 TABLET | Refills: 0 | Status: SHIPPED | OUTPATIENT
Start: 2025-08-04

## 2025-08-04 RX ORDER — TRAMADOL HYDROCHLORIDE 50 MG/1
50 TABLET ORAL EVERY 6 HOURS PRN
Qty: 20 TABLET | Refills: 0 | Status: SHIPPED | OUTPATIENT
Start: 2025-08-04 | End: 2025-08-09

## 2025-08-06 ENCOUNTER — OFFICE VISIT (OUTPATIENT)
Dept: ORTHOPEDIC SURGERY | Age: 40
End: 2025-08-06
Payer: COMMERCIAL

## 2025-08-06 VITALS — HEIGHT: 70 IN | BODY MASS INDEX: 25.63 KG/M2 | WEIGHT: 179.01 LBS

## 2025-08-06 DIAGNOSIS — M51.26 HERNIATION OF INTERVERTEBRAL DISC BETWEEN L4 AND L5: Primary | ICD-10-CM

## 2025-08-06 PROCEDURE — 99214 OFFICE O/P EST MOD 30 MIN: CPT | Performed by: INTERNAL MEDICINE

## 2025-08-06 RX ORDER — MELOXICAM 15 MG/1
15 TABLET ORAL DAILY PRN
Qty: 30 TABLET | Refills: 2 | Status: SHIPPED | OUTPATIENT
Start: 2025-08-06

## 2025-08-07 ENCOUNTER — PATIENT MESSAGE (OUTPATIENT)
Dept: ORTHOPEDIC SURGERY | Age: 40
End: 2025-08-07

## 2025-08-15 ENCOUNTER — HOSPITAL ENCOUNTER (OUTPATIENT)
Dept: INTERVENTIONAL RADIOLOGY/VASCULAR | Age: 40
Discharge: HOME | End: 2025-08-17
Attending: INTERNAL MEDICINE
Payer: COMMERCIAL

## 2025-08-15 DIAGNOSIS — M54.16 LUMBAR RADICULOPATHY: ICD-10-CM

## 2025-08-15 PROCEDURE — 62323 NJX INTERLAMINAR LMBR/SAC: CPT

## 2025-08-15 PROCEDURE — 6360000004 HC RX CONTRAST MEDICATION: Performed by: STUDENT IN AN ORGANIZED HEALTH CARE EDUCATION/TRAINING PROGRAM

## 2025-08-15 PROCEDURE — 2709999900 HC NON-CHARGEABLE SUPPLY

## 2025-08-15 PROCEDURE — 6360000002 HC RX W HCPCS: Performed by: STUDENT IN AN ORGANIZED HEALTH CARE EDUCATION/TRAINING PROGRAM

## 2025-08-15 RX ORDER — BETAMETHASONE SODIUM PHOSPHATE AND BETAMETHASONE ACETATE 3; 3 MG/ML; MG/ML
INJECTION, SUSPENSION INTRA-ARTICULAR; INTRALESIONAL; INTRAMUSCULAR; SOFT TISSUE PRN
Status: COMPLETED | OUTPATIENT
Start: 2025-08-15 | End: 2025-08-15

## 2025-08-15 RX ORDER — BUPIVACAINE HYDROCHLORIDE 2.5 MG/ML
INJECTION, SOLUTION EPIDURAL; INFILTRATION; INTRACAUDAL; PERINEURAL PRN
Status: COMPLETED | OUTPATIENT
Start: 2025-08-15 | End: 2025-08-15

## 2025-08-15 RX ORDER — LIDOCAINE HYDROCHLORIDE 10 MG/ML
INJECTION, SOLUTION EPIDURAL; INFILTRATION; INTRACAUDAL; PERINEURAL PRN
Status: COMPLETED | OUTPATIENT
Start: 2025-08-15 | End: 2025-08-15

## 2025-08-15 RX ORDER — IOPAMIDOL 408 MG/ML
INJECTION, SOLUTION INTRATHECAL PRN
Status: COMPLETED | OUTPATIENT
Start: 2025-08-15 | End: 2025-08-15

## 2025-08-15 RX ADMIN — LIDOCAINE HYDROCHLORIDE 10 ML: 10 INJECTION, SOLUTION EPIDURAL; INFILTRATION; INTRACAUDAL; PERINEURAL at 11:11

## 2025-08-15 RX ADMIN — IOPAMIDOL 2 ML: 408 INJECTION, SOLUTION INTRATHECAL at 11:12

## 2025-08-15 RX ADMIN — BETAMETHASONE SODIUM PHOSPHATE AND BETAMETHASONE ACETATE 12 MG: 3; 3 INJECTION, SUSPENSION INTRA-ARTICULAR; INTRALESIONAL; INTRAMUSCULAR at 11:11

## 2025-08-15 RX ADMIN — BUPIVACAINE HYDROCHLORIDE 5 ML: 2.5 INJECTION, SOLUTION EPIDURAL; INFILTRATION; INTRACAUDAL at 11:11

## 2025-08-20 ENCOUNTER — OFFICE VISIT (OUTPATIENT)
Dept: ORTHOPEDIC SURGERY | Age: 40
End: 2025-08-20

## 2025-08-20 VITALS — BODY MASS INDEX: 25.62 KG/M2 | HEIGHT: 70 IN | WEIGHT: 179 LBS

## 2025-08-20 DIAGNOSIS — M51.26 HERNIATION OF INTERVERTEBRAL DISC BETWEEN L4 AND L5: Primary | ICD-10-CM

## 2025-08-24 ENCOUNTER — TELEPHONE (OUTPATIENT)
Dept: ORTHOPEDIC SURGERY | Age: 40
End: 2025-08-24